# Patient Record
Sex: FEMALE | Race: WHITE | NOT HISPANIC OR LATINO | URBAN - METROPOLITAN AREA
[De-identification: names, ages, dates, MRNs, and addresses within clinical notes are randomized per-mention and may not be internally consistent; named-entity substitution may affect disease eponyms.]

---

## 2023-12-07 ENCOUNTER — APPOINTMENT (OUTPATIENT)
Dept: NEUROSURGERY | Facility: CLINIC | Age: 48
End: 2023-12-07
Payer: COMMERCIAL

## 2024-01-11 ENCOUNTER — OFFICE VISIT (OUTPATIENT)
Dept: NEUROSURGERY | Facility: CLINIC | Age: 49
End: 2024-01-11
Payer: COMMERCIAL

## 2024-01-11 VITALS
HEART RATE: 72 BPM | WEIGHT: 205 LBS | TEMPERATURE: 96.4 F | BODY MASS INDEX: 32.18 KG/M2 | HEIGHT: 67 IN | SYSTOLIC BLOOD PRESSURE: 159 MMHG | DIASTOLIC BLOOD PRESSURE: 83 MMHG

## 2024-01-11 DIAGNOSIS — D32.9 MENINGIOMA (MULTI): Primary | ICD-10-CM

## 2024-01-11 PROCEDURE — 99213 OFFICE O/P EST LOW 20 MIN: CPT | Performed by: NURSE PRACTITIONER

## 2024-01-11 RX ORDER — PANTOPRAZOLE SODIUM 40 MG/1
40 TABLET, DELAYED RELEASE ORAL
COMMUNITY

## 2024-01-11 ASSESSMENT — PAIN SCALES - GENERAL: PAINLEVEL: 0-NO PAIN

## 2024-01-11 NOTE — PROGRESS NOTES
"NEUROSURGERY EVALUATION    Diagnosis  Erica was seen today for new patient visit.  Diagnoses and all orders for this visit:  Meningioma (CMS/HCC)        History of Present Illness  Erica Wayne is a 48-year-old female who presented for evaluation of sinus headache and was incidentally found to have two right hemisphere meningioma. MRI on 03/30/23 demonstrated an extra-axial R parietal convexity lesion measuring 2.4 x 2.8 x 2.7cm with dural tail. A second lesion was identified at the right parasagittal aspect of right parieto-occipital convexity measuring 2.6 x 1.6 x 2.6cm. Patient was evaluated by Dr. Mills who recommended a short interval follow up MRI which was completed 07/05/23 and stable. Patient presents to clinic for FUV.     Reports intermittent headaches that are secondary to her sinus. No blurred or double vision. No history of seizure.           Vitals  /83   Pulse 72   Temp 35.8 °C (96.4 °F)   Ht 1.702 m (5' 7\")   Wt 93 kg (205 lb)   BMI 32.11 kg/m²       Physical Exam  A&Ox3   Fluent speech   EOMI   HEADLEY; strength 5/5; no drift   SILT   Face and should shrug symmetrical   Gait normal       Provider Impressions  Patient is a 48-year-old female who presents for follow up after undergoing evaluation of sinus headache incidentally found to have two right hemisphere meningioma. MRI demonstrated an extra-axial R parietal convexity lesion measuring 2.4 x 2.8 x 2.7cm with dural tail. A second lesion was identified at the right parasagittal aspect of right parieto-occipital convexity measuring 2.6 x 1.6 x 2.6cm. Short interval follow up MRI remained stable. No neurological complaints at this time. Patient is due for a MRI Brain w/wo contrast. If stable will plan to increase interval to one year. Once imaging is completed will call patient with results. Plan discussed with patient who was in agreement. All questions answered.       Previous History  No past medical history on file.  Past Surgical " History:   Procedure Laterality Date    COLONOSCOPY  01/31/2018    Complete Colonoscopy    OTHER SURGICAL HISTORY  02/15/2018    Cholecystectomy Laparoscopic Robotic-Assisted        No family history on file.  No Known Allergies  Current Outpatient Medications   Medication Instructions    pantoprazole (PROTONIX) 40 mg, oral, Daily before breakfast, Do not crush, chew, or split.

## 2024-02-07 ENCOUNTER — DOCUMENTATION (OUTPATIENT)
Dept: NEUROSURGERY | Facility: HOSPITAL | Age: 49
End: 2024-02-07
Payer: COMMERCIAL

## 2024-02-07 NOTE — PROGRESS NOTES
Results Review     Imaging: MRI Brain w/wo 01/19/24 Continues to show extra-axial dural based R parietal convexity meningioma measuring 2.8cm and 2.4cm R parasagittal partial lesion that extends into the superior sagittal sinus (patent) that remain unchanged. Given stability will continue with surveillance imaging.       Follow Up:  MRI brain w/wo in 1 year.     Left VM for patient.

## 2024-02-15 PROBLEM — K80.20 CHOLELITHIASIS: Status: ACTIVE | Noted: 2024-02-15

## 2024-02-15 PROBLEM — D32.0 BENIGN NEOPLASM OF CEREBRAL MENINGES (MULTI): Status: ACTIVE | Noted: 2023-04-25

## 2024-02-15 RX ORDER — PANTOPRAZOLE SODIUM 40 MG/1
40 TABLET, DELAYED RELEASE ORAL
COMMUNITY
Start: 2018-01-31

## 2024-02-15 RX ORDER — VALACYCLOVIR HYDROCHLORIDE 1 G/1
TABLET, FILM COATED ORAL
COMMUNITY
Start: 2023-10-02

## 2024-02-15 RX ORDER — PANTOPRAZOLE SODIUM 20 MG/1
20 TABLET, DELAYED RELEASE ORAL
COMMUNITY
Start: 2023-08-29

## 2024-02-15 RX ORDER — METHYLPREDNISOLONE 4 MG/1
4 TABLET ORAL
COMMUNITY
Start: 2023-03-12

## 2024-02-15 RX ORDER — DOXYCYCLINE HYCLATE 100 MG
100 TABLET ORAL
COMMUNITY
Start: 2023-03-21

## 2025-01-19 DIAGNOSIS — D32.9 MENINGIOMA (MULTI): Primary | ICD-10-CM

## 2025-02-04 ENCOUNTER — HOSPITAL ENCOUNTER (OUTPATIENT)
Dept: RADIOLOGY | Facility: EXTERNAL LOCATION | Age: 50
Discharge: HOME | End: 2025-02-04

## 2025-02-06 ENCOUNTER — APPOINTMENT (OUTPATIENT)
Dept: NEUROSURGERY | Facility: CLINIC | Age: 50
End: 2025-02-06
Payer: COMMERCIAL

## 2025-02-13 ENCOUNTER — OFFICE VISIT (OUTPATIENT)
Dept: NEUROSURGERY | Facility: CLINIC | Age: 50
End: 2025-02-13
Payer: COMMERCIAL

## 2025-02-13 VITALS
HEART RATE: 79 BPM | WEIGHT: 205 LBS | SYSTOLIC BLOOD PRESSURE: 139 MMHG | DIASTOLIC BLOOD PRESSURE: 80 MMHG | RESPIRATION RATE: 18 BRPM | BODY MASS INDEX: 32.11 KG/M2 | TEMPERATURE: 97.4 F

## 2025-02-13 DIAGNOSIS — D32.9 MENINGIOMA (MULTI): Primary | ICD-10-CM

## 2025-02-13 PROCEDURE — 99214 OFFICE O/P EST MOD 30 MIN: CPT | Mod: 57 | Performed by: NEUROLOGICAL SURGERY

## 2025-02-13 PROCEDURE — 99214 OFFICE O/P EST MOD 30 MIN: CPT | Performed by: NEUROLOGICAL SURGERY

## 2025-02-13 RX ORDER — DIAPER,BRIEF,ADULT, DISPOSABLE
500 EACH MISCELLANEOUS DAILY
COMMUNITY

## 2025-02-13 ASSESSMENT — PAIN SCALES - GENERAL: PAINLEVEL_OUTOF10: 0-NO PAIN

## 2025-02-13 NOTE — PROGRESS NOTES
[facility]  Neurosurgery    History of Present Illness      Erica Wayne is a 49-year-old female with no significant PMH who presented for evaluation for sinus headache and was incidentally found to have two right hemisphere meningioma upon workup. Right parietal convexity and right parasagittal lesion. Given no edema or mass effect patient was recommended for continued observation with surveillance. Her most recent imaging concerning for growth of right parietal lesion convexity lesion. Patient presents to clinic for follow up.       She denies headaches weakness numbness seizures. She is not on any hormone replacement medications. She recently had a mammogram which was negative. Not on any blood thinners.             Objective      Vitals:   /80   Pulse 79   Temp 36.3 °C (97.4 °F)   Resp 18   Wt 93 kg (205 lb)   BMI 32.11 kg/m²         Physical Exam:      Awake, alert, oriented times 3.   EOM intact  Pupils are equal round and reactive  Intact facial sensation  No facial asymmetry  Intact hearing bilateraly  Midline tongue protrusion  Symmetric shoulder elevation  Symmetric head turning    5/5 in all extremities  No sensory deficits          Relevant Results:  We personally reviewed her MRI which shows growth of the right convexity meningioma as well as the stable parasagittal meningioma.   MRI Brain w/wo 01/29/25 --> -1/19/24 --> 07/205/23                      Assessment & Plan      Diagnosis:  Diagnoses and all orders for this visit:  Meningioma (Multi)          Provider Impression:   Mrs Wayne presents to clinic to discuss her recent imaging showing growth of her right convexity meningioma. We discussed that given the growth of the meningioma and its current size, we do favor surgical resection. The smaller meningioma does not need treatment at this time, but if it were to grow further, we will discuss radiation therapy vs. Surgery. She wishes to proceed with surgery.     Patient seen and  examined and has growoing right conviexity neoplasm    Natural history and treatment options discussed in detail.    Given patient age, symptoms, etc. I recommend consideration for right craniotomy for resection of mass    Risks of treatment and alternatives discussed in detail (bleeding, infection, paralysis, stroke, death, radiation, seizures, paralysis, observation, etc.) and patient agrees to proceed as noted above.    I also discussed that I perform overlapping surgical cases but would be present for all critical portions of the surgical procedure.      Medical History     No past medical history on file.  Past Surgical History:   Procedure Laterality Date    COLONOSCOPY  01/31/2018    Complete Colonoscopy    OTHER SURGICAL HISTORY  02/15/2018    Cholecystectomy Laparoscopic Robotic-Assisted        No family history on file.  No Known Allergies  Current Outpatient Medications   Medication Instructions    cholecalciferol, vitamin D3, (D3-2000 ORAL) oral, Daily    doxycycline (VIBRA-TABS) 100 mg    loratadine (CLARITIN ORAL) ORAL, DAILY, Date: 09/19/2018 15:14:00 EDT    lysine 500 mg, oral, Daily    methylPREDNISolone (MEDROL DOSPAK) 4 mg    pantoprazole (PROTONIX) 40 mg, Daily before breakfast    pantoprazole (PROTONIX) 20 mg    pantoprazole (PROTONIX) 40 mg    valACYclovir (Valtrex) 1 gram tablet take 2 tablets by mouth twice a day for 1 day at first sign of cold sore

## 2025-04-03 ENCOUNTER — HOSPITAL ENCOUNTER (OUTPATIENT)
Facility: HOSPITAL | Age: 50
Setting detail: SURGERY ADMIT
DRG: 027 | End: 2025-04-03
Attending: NEUROLOGICAL SURGERY | Admitting: NEUROLOGICAL SURGERY
Payer: COMMERCIAL

## 2025-04-03 ENCOUNTER — PREP FOR PROCEDURE (OUTPATIENT)
Dept: NEUROSURGERY | Facility: HOSPITAL | Age: 50
End: 2025-04-03
Payer: COMMERCIAL

## 2025-04-03 DIAGNOSIS — K59.03 CONSTIPATION DUE TO PAIN MEDICATION: ICD-10-CM

## 2025-04-03 DIAGNOSIS — D32.9 MENINGIOMA (MULTI): Primary | ICD-10-CM

## 2025-04-03 DIAGNOSIS — G89.18 POST-OP PAIN: ICD-10-CM

## 2025-04-10 ENCOUNTER — PRE-ADMISSION TESTING (OUTPATIENT)
Dept: PREADMISSION TESTING | Facility: HOSPITAL | Age: 50
End: 2025-04-10
Payer: COMMERCIAL

## 2025-04-10 VITALS
HEART RATE: 77 BPM | WEIGHT: 218.8 LBS | BODY MASS INDEX: 34.34 KG/M2 | SYSTOLIC BLOOD PRESSURE: 137 MMHG | OXYGEN SATURATION: 97 % | HEIGHT: 67 IN | DIASTOLIC BLOOD PRESSURE: 85 MMHG | TEMPERATURE: 98.3 F

## 2025-04-10 DIAGNOSIS — D32.9 MENINGIOMA (MULTI): ICD-10-CM

## 2025-04-10 DIAGNOSIS — I34.1 MVP (MITRAL VALVE PROLAPSE): ICD-10-CM

## 2025-04-10 DIAGNOSIS — Z01.818 PREOPERATIVE EXAMINATION: Primary | ICD-10-CM

## 2025-04-10 LAB
ABO GROUP (TYPE) IN BLOOD: NORMAL
ANION GAP SERPL CALC-SCNC: 16 MMOL/L (ref 10–20)
ANTIBODY SCREEN: NORMAL
BUN SERPL-MCNC: 14 MG/DL (ref 6–23)
CALCIUM SERPL-MCNC: 9.2 MG/DL (ref 8.6–10.6)
CHLORIDE SERPL-SCNC: 105 MMOL/L (ref 98–107)
CO2 SERPL-SCNC: 21 MMOL/L (ref 21–32)
CREAT SERPL-MCNC: 0.66 MG/DL (ref 0.5–1.05)
EGFRCR SERPLBLD CKD-EPI 2021: >90 ML/MIN/1.73M*2
ERYTHROCYTE [DISTWIDTH] IN BLOOD BY AUTOMATED COUNT: 12.8 % (ref 11.5–14.5)
GLUCOSE SERPL-MCNC: 126 MG/DL (ref 74–99)
HCT VFR BLD AUTO: 39.2 % (ref 36–46)
HGB BLD-MCNC: 12.7 G/DL (ref 12–16)
MCH RBC QN AUTO: 30 PG (ref 26–34)
MCHC RBC AUTO-ENTMCNC: 32.4 G/DL (ref 32–36)
MCV RBC AUTO: 93 FL (ref 80–100)
NRBC BLD-RTO: 0 /100 WBCS (ref 0–0)
PLATELET # BLD AUTO: 329 X10*3/UL (ref 150–450)
POTASSIUM SERPL-SCNC: 4.4 MMOL/L (ref 3.5–5.3)
RBC # BLD AUTO: 4.24 X10*6/UL (ref 4–5.2)
RH FACTOR (ANTIGEN D): NORMAL
SODIUM SERPL-SCNC: 138 MMOL/L (ref 136–145)
WBC # BLD AUTO: 5.6 X10*3/UL (ref 4.4–11.3)

## 2025-04-10 PROCEDURE — 87081 CULTURE SCREEN ONLY: CPT

## 2025-04-10 PROCEDURE — 36415 COLL VENOUS BLD VENIPUNCTURE: CPT

## 2025-04-10 PROCEDURE — 86901 BLOOD TYPING SEROLOGIC RH(D): CPT

## 2025-04-10 PROCEDURE — 82374 ASSAY BLOOD CARBON DIOXIDE: CPT

## 2025-04-10 PROCEDURE — 99204 OFFICE O/P NEW MOD 45 MIN: CPT

## 2025-04-10 PROCEDURE — 85027 COMPLETE CBC AUTOMATED: CPT

## 2025-04-10 RX ORDER — CHLORHEXIDINE GLUCONATE ORAL RINSE 1.2 MG/ML
15 SOLUTION DENTAL AS NEEDED
Qty: 120 ML | Refills: 0 | Status: SHIPPED | OUTPATIENT
Start: 2025-04-10 | End: 2025-04-12

## 2025-04-10 RX ORDER — CHLORHEXIDINE GLUCONATE 40 MG/ML
SOLUTION TOPICAL DAILY PRN
Qty: 473 ML | Refills: 0 | Status: SHIPPED | OUTPATIENT
Start: 2025-04-10

## 2025-04-10 ASSESSMENT — DUKE ACTIVITY SCORE INDEX (DASI)
CAN YOU RUN A SHORT DISTANCE: YES
CAN YOU DO MODERATE WORK AROUND THE HOUSE LIKE VACUUMING, SWEEPING FLOORS OR CARRYING GROCERIES: YES
CAN YOU DO HEAVY WORK AROUND THE HOUSE LIKE SCRUBBING FLOORS OR LIFTING AND MOVING HEAVY FURNITURE: YES
CAN YOU TAKE CARE OF YOURSELF (EAT, DRESS, BATHE, OR USE TOILET): YES
CAN YOU HAVE SEXUAL RELATIONS: YES
CAN YOU PARTICIPATE IN MODERATE RECREATIONAL ACTIVITIES LIKE GOLF, BOWLING, DANCING, DOUBLES TENNIS OR THROWING A BASEBALL OR FOOTBALL: YES
CAN YOU DO LIGHT WORK AROUND THE HOUSE LIKE DUSTING OR WASHING DISHES: YES
CAN YOU WALK A BLOCK OR TWO ON LEVEL GROUND: YES
DASI METS SCORE: 9.9
CAN YOU DO YARD WORK LIKE RAKING LEAVES, WEEDING OR PUSHING A MOWER: YES
CAN YOU PARTICIPATE IN STRENOUS SPORTS LIKE SWIMMING, SINGLES TENNIS, FOOTBALL, BASKETBALL, OR SKIING: YES
CAN YOU WALK INDOORS, SUCH AS AROUND YOUR HOUSE: YES
TOTAL_SCORE: 58.2
CAN YOU CLIMB A FLIGHT OF STAIRS OR WALK UP A HILL: YES

## 2025-04-10 ASSESSMENT — ENCOUNTER SYMPTOMS
EYE DISCHARGE: 0
NAUSEA: 0
PND: 0
EXCESSIVE BLEEDING: 0
NERVOUS/ANXIOUS: 0
DYSURIA: 0
PALPITATIONS: 0
VOICE CHANGE: 0
TREMORS: 0
POLYDIPSIA: 0
WHEEZING: 0
NECK STIFFNESS: 0
CHILLS: 0
LIGHT-HEADEDNESS: 0
WOUND: 0
ARTHRALGIAS: 0
JOINT SWELLING: 0
MYALGIAS: 0
NECK MASS: 0
VERTIGO: 0
CONSTIPATION: 0
BLOOD IN STOOL: 0
DYSPNEA WITH EXERTION: 0
LIMITED RANGE OF MOTION: 0
BRUISES/BLEEDS EASILY: 0
NECK PAIN: 0
DIFFICULTY URINATING: 0
RHINORRHEA: 0
VISUAL CHANGE: 0
NUMBNESS: 0
UNEXPECTED WEIGHT CHANGE: 0
DYSPNEA AT REST: 0
SKIN CHANGES: 0
TROUBLE SWALLOWING: 0
HEMOPTYSIS: 0
ABDOMINAL PAIN: 0
ABDOMINAL DISTENTION: 0
DIARRHEA: 0
FEVER: 0
DOUBLE VISION: 0
WEAKNESS: 0
COUGH: 0
VOMITING: 0
NECK SWELLING: 0
SINUS CONGESTION: 0
SHORTNESS OF BREATH: 0
CONFUSION: 0

## 2025-04-10 ASSESSMENT — LIFESTYLE VARIABLES: SMOKING_STATUS: NONSMOKER

## 2025-04-10 NOTE — CPM/PAT H&P
CPM/PAT Evaluation       Name: Erica Wayne (Erica Wayne)  /Age: 1975/49 y.o.     Visit Type:   In-Person       Chief Complaint: perioperative evaluation    HPI HPI: 48 y/o female scheduled for Right craniotomy for tumor resection - Right  on 2025  secondary to Meningioma  with Dr. Selwyn Lewis  who referred to [facility].  Presents to [facility] today for perioperative risk stratification and optimization. PMHX includes Meningioma, migraines, GERD, shatzki ring, MVP     PCP: JAMARCUS CARBAJAL   Specialists:   Neurosurgery - Selwyn Lewis MD .          Past Medical History:   Diagnosis Date    Colon polyp     GERD (gastroesophageal reflux disease)        Past Surgical History:   Procedure Laterality Date    COLONOSCOPY  2018    Complete Colonoscopy    OTHER SURGICAL HISTORY  02/15/2018    Cholecystectomy Laparoscopic Robotic-Assisted       Patient Sexual activity questions deferred to the physician.    Family History   Problem Relation Name Age of Onset    No Known Problems Mother      Hypertension Father      Diabetes Father         No Known Allergies    Prior to Admission medications    Medication Sig Start Date End Date Taking? Authorizing Provider   cholecalciferol, vitamin D3, (D3-2000 ORAL) Take by mouth once daily.    Historical Provider, MD   doxycycline (Vibra-Tabs) 100 mg tablet 1 tablet (100 mg).  Patient not taking: Reported on 2025 3/21/23   Historical Provider, MD   loratadine (CLARITIN ORAL) ORAL, DAILY, Date: 2018 15:14:00 EDT  Patient not taking: Reported on 2025   Historical Provider, MD   lysine 500 mg tablet Take 1 tablet (500 mg) by mouth once daily.    Historical Provider, MD   methylPREDNISolone (Medrol Dospak) 4 mg tablets 1 tablet (4 mg).  Patient not taking: Reported on 2025 3/12/23   Historical Provider, MD   pantoprazole (ProtoNix) 20 mg EC tablet 1 tablet (20 mg). 23   Historical Provider, MD   pantoprazole (ProtoNix) 40 mg EC tablet  Take 1 tablet (40 mg) by mouth once daily in the morning. Take before meals. Do not crush, chew, or split.  Patient not taking: Reported on 2/13/2025    Historical Provider, MD   pantoprazole (Protonix) 40 mg EC tablet Take 1 tablet (40 mg) by mouth.  Patient not taking: Reported on 2/13/2025 1/31/18   Historical Provider, MD   valACYclovir (Valtrex) 1 gram tablet take 2 tablets by mouth twice a day for 1 day at first sign of cold sore  Patient not taking: Reported on 2/13/2025 10/2/23   Historical Provider, MD LEA ROS:   Constitutional:    no fever   no chills   no unexpected weight change  Neuro/Psych:    no numbness   no weakness   no light-headedness   no tremors   no confusion   not nervous/anxious   no self-injury   no suicidal ideation  Eyes:    no discharge   no vision loss   no diplopia   no visual disturbance   use of corrective lenses (readers)  Ears:    no ear pain   no hearing loss   no vertigo   no tinnitus   no hearing aides  Nose:    no nasal discharge   no sinus congestion   no epistaxis  Mouth:    no dental issues   no mouth pain   no oral bleeding   no mouth lesions  Throat:    no throat pain   no dysphagia   no voice change  Neck:    no neck pain   neck swelling   no neck stiffness   no neck masses  Cardio:    no chest pain   no palpitations   no peripheral edema   no dyspnea   no YANG   no paroxysmal nocturnal dyspnea  Respiratory:    no cough   no wheezing   no hemoptysis   no shortness of breath  Endocrine:    no cold intolerance   no heat intolerance   no polydipsia  GI:    no abdominal distention   no abdominal pain   no constipation   no diarrhea   no nausea   no vomiting   no blood in stool  :    no difficulty urinating   no dysuria   no oliguria   no polyuria  Musculoskeletal:    no arthralgias   no myalgias   no decreased ROM   no swelling  Hematologic:    does not bruise/bleed easily   no excessive bleeding   no history of blood transfusion   no blood clots  Skin:   no skin  "changes   no sores/wound   no rash      Physical Exam  Vitals reviewed. Physical exam within normal limits.   Constitutional:       General: She is not in acute distress.     Appearance: Normal appearance. She is normal weight. She is not ill-appearing, toxic-appearing or diaphoretic.   HENT:      Head: Normocephalic.      Nose: Nose normal. No congestion or rhinorrhea.      Mouth/Throat:      Mouth: Mucous membranes are moist.      Pharynx: Oropharynx is clear. No oropharyngeal exudate or posterior oropharyngeal erythema.   Eyes:      General: No scleral icterus.        Right eye: No discharge.         Left eye: No discharge.      Conjunctiva/sclera: Conjunctivae normal.   Neck:      Vascular: No carotid bruit.   Cardiovascular:      Rate and Rhythm: Normal rate and regular rhythm.      Pulses: Normal pulses.      Heart sounds: Normal heart sounds. No murmur heard.     No friction rub. No gallop.   Pulmonary:      Effort: Pulmonary effort is normal. No respiratory distress.      Breath sounds: Normal breath sounds. No stridor. No wheezing, rhonchi or rales.   Chest:      Chest wall: No tenderness.   Musculoskeletal:         General: No swelling or tenderness. Normal range of motion.      Cervical back: Normal range of motion and neck supple. No rigidity or tenderness.   Skin:     General: Skin is warm and dry.   Neurological:      General: No focal deficit present.      Mental Status: She is alert.      Motor: No weakness.   Psychiatric:         Mood and Affect: Mood normal.         Behavior: Behavior normal.         Thought Content: Thought content normal.         Judgment: Judgment normal.          PAT AIRWAY:   Airway:     Mallampati::  I    TM distance::  >3 FB    Neck ROM::  Full        Visit Vitals  /85   Pulse 77   Temp 36.8 °C (98.3 °F) (Temporal)   Ht 1.702 m (5' 7\")   Wt 99.2 kg (218 lb 12.8 oz)   SpO2 97%   BMI 34.27 kg/m²   Smoking Status Never   BSA 2.17 m²       DASI Risk Score      Flowsheet " Row Pre-Admission Testing from 4/10/2025 in [facility]   Can you take care of yourself (eat, dress, bathe, or use toilet)?  2.75 filed at 04/10/2025 0745   Can you walk indoors, such as around your house? 1.75 filed at 04/10/2025 0745   Can you walk a block or two on level ground?  2.75 filed at 04/10/2025 0745   Can you climb a flight of stairs or walk up a hill? 5.5 filed at 04/10/2025 0745   Can you run a short distance? 8 filed at 04/10/2025 0745   Can you do light work around the house like dusting or washing dishes? 2.7 filed at 04/10/2025 0745   Can you do moderate work around the house like vacuuming, sweeping floors or carrying groceries? 3.5 filed at 04/10/2025 0745   Can you do heavy work around the house like scrubbing floors or lifting and moving heavy furniture?  8 filed at 04/10/2025 0745   Can you do yard work like raking leaves, weeding or pushing a mower? 4.5 filed at 04/10/2025 0745   Can you have sexual relations? 5.25 filed at 04/10/2025 0745   Can you participate in moderate recreational activities like golf, bowling, dancing, doubles tennis or throwing a baseball or football? 6 filed at 04/10/2025 0745   Can you participate in strenous sports like swimming, singles tennis, football, basketball, or skiing? 7.5 filed at 04/10/2025 0745   DASI SCORE 58.2 filed at 04/10/2025 0745   METS Score (Will be calculated only when all the questions are answered) 9.9 filed at 04/10/2025 0745          Capyanirai DVT Assessment      Flowsheet Row Pre-Admission Testing from 4/10/2025 in [facility]   DVT Score (IF A SCORE IS NOT CALCULATING, MUST SELECT A BMI TO COMPLETE) 9 filed at 04/10/2025 0945   Surgical Factors Major surgery planned, lasting over 3 hours filed at 04/10/2025 0945   BMI (BMI MUST BE CHOSEN) 31-40 (Obesity) filed at 04/10/2025 0945          Modified Frailty Index    No data to display       [id number] Stroke Risk Points  Current as of just now        N/A 0 to  9 Points:      Last Change: N/A          The [id number] risk score (Lip GH, et al. 2009. © 2010 [employer]) quantifies the risk of stroke for a patient with atrial fibrillation. For patients without atrial fibrillation or under the age of 18 this score appears as N/A. Higher score values generally indicate higher risk of stroke.        This score is not applicable to this patient. Components are not calculated.          Revised Cardiac Risk Index      Flowsheet Row Pre-Admission Testing from 4/10/2025 in [facility]   High-Risk Surgery (Intraperitoneal, Intrathoracic,Suprainguinal vascular) 0 filed at 04/10/2025 0944   History of ischemic heart disease (History of MI, History of positive exercuse test, Current chest paint considered due to myocardial ischemia, Use of nitrate therapy, ECG with pathological Q Waves) 0 filed at 04/10/2025 0944   History of congestive heart failure (pulmonary edemia, bilateral rales or S3 gallop, Paroxysmal nocturnal dyspnea, CXR showing pulmonary vascular redistribution) 0 filed at 04/10/2025 0944   History of cerebrovascular disease (Prior TIA or stroke) 0 filed at 04/10/2025 0944   Pre-operative insulin treatment 0 filed at 04/10/2025 0944   Pre-operative creatinine>2 mg/dl 0 filed at 04/10/2025 0944   Revised Cardiac Risk Calculator 0 filed at 04/10/2025 0944          Apfel Simplified Score      Flowsheet Row Pre-Admission Testing from 4/10/2025 in [facility]   Smoking status 1 filed at 04/10/2025 0945   History of motion sickness or PONV  0 filed at 04/10/2025 0945   Use of postoperative opioids 1 filed at 04/10/2025 0945   Gender - Female 1=Yes filed at 04/10/2025 0945   Apfel Simplified Score Calculator 3 filed at 04/10/2025 0945          Risk Analysis Index Results This Encounter    No data found in the last 10 encounters.       Stop Bang Score      Flowsheet Row Pre-Admission Testing from 4/10/2025 in [facility]   Do you snore loudly? 0 filed at 04/10/2025 0746   Do you often feel tired or fatigued after your sleep? 0 filed at 04/10/2025 0746   Has anyone ever observed you stop breathing in your sleep? 0 filed at 04/10/2025 0746   Do you have or are you being treated for high blood pressure? 0 filed at 04/10/2025 0746   Recent BMI (Calculated) 32.1 filed at 04/10/2025 0746   Is BMI greater than 35 kg/m2? 0=No filed at 04/10/2025 0746   Age older than 50 years old? 1=Yes filed at 04/10/2025 0746   Is your neck circumference greater than 17 inches (Male) or 16 inches (Female)? 0 filed at 04/10/2025 0746   Gender - Male 0=No filed at 04/10/2025 0746   STOP-BANG Total Score 1 filed at 04/10/2025 0746          Prodigy: High Risk  Total Score: 0          ARISCAT Score for Postoperative Pulmonary Complications      Flowsheet Row Pre-Admission Testing from 4/10/2025 in [facility]   Age Calculated Score 3 filed at 04/10/2025 0944   Preoperative SpO2 0 filed at 04/10/2025 0944   Respiratory infection in the last month Either upper or lower (i.e., URI, bronchitis, pneumonia), with fever and antibiotic treatment 0 filed at 04/10/2025 0944   Preoperative anemia (Hgb less than 10 g/dl) 0 filed at 04/10/2025 0944   Surgical incision  0 filed at 04/10/2025 0944   Duration of surgery  23 filed at 04/10/2025 0944   Emergency Procedure  0 filed at 04/10/2025 0944   ARISCAT Total Score  26 filed at 04/10/2025 0944          Jenkins Perioperative Risk for Myocardial Infarction or Cardiac Arrest (CHRISTOPHER)    No data to display           Assessment and Plan:   Anesthesia/Airway:  No anesthesia complications      Neuro:    #Meningioma, migraines - managed by neurosurgery and PCP. Seeking surgical intervention for meningioma.  Patient reports that she took azithromycin about a week ago due to nasal congestion and headache.  She denies at any point bodyache/fever/chils/cough/sore throat.  Patient reports complete resolution  "at this time    Patient is at an increased risk for post operative delirium secondary to type and duration of surgery.  Preoperative brain exercise educational handout provided to patient.    The patient is at an increased risk for perioperative stroke secondary to female sex , neuro surgery, general anesthesia, and op time >2.5 hours.       HEENT/Airway:    No HEENT diagnosis or significant findings on chart review or clinical presentation and evaluation. No further preoperative testing/intervention indicated at this time.      Cardiovascular:    #MVP -  follows with PCP, patient reports she has not had an echocardiogram since original diagnosis.  She does not follow with cardiology reports her primary care will tell her she has \"regurgitation\" and has debated sending her cardiology however has not.  No murmur noted on physical exam today, patient is asymptomatic. BP today is 137/85. METS is >4 and scheduled for non-cardiac surgery.  Echocardiograms prior to surgery for further evaluation of murmur.    METS are 9.9    RCRI  0 which is 3.9% 30 day risk of MACE (risk for cardiac death, nonfatal myocardial infarction, and nonfactal cardiac arrest    CHRISTOPHER score which indicates a   0.2 % risk of intraoperative or 30-day postoperative MACE          Pulmonary:    No Pulmonary diagnosis or significant findings on chart review or clinical presentation and evaluation. Preoperative deep breathing educational handout provided to patient.    No pulmonary diagnosis, however patient is at increased risk of perioperative complications secondary to  obesity, duration of surgery > 2 hours, types of anesthetic    ARISCAT:    26  points which is a intermediate (13.3%) risk of in-hospital post-op pulmonary complications     PRODIGY:  0  points which is a low risk of post op opioid induced respiratory depression episodes    STOP BAN   points which is a low risk for moderate to severe ALANIS    [facility] toilet education discussed, " patient also provided deep breathing exercises and incentive spirometry educational handout      Renal:   No renal diagnosis, however patient is at increase risk for perioperative renal complications secondary to  BMI equal to or greater than 30       Endocrine:   No endocrine diagnosis or significant findings on chart review or clinical presentation and evaluation. No further testing or intervention is indicated at this time.      Hematologic:      Preoperative DVT educational handout provided to patient.  No hematologic diagnosis, however patient is at an increased risk for DVT  Caprini Score:  9  points which is a highest risk of perioperative VTE      Gastrointestinal:   #GERD, shatzki ring, -medicated with pantoprazole (continue), follows with PCP.  Reports is well-controlled no further need for perioperative intervention    EAT-10 score of    0  : self-perceived oropharyngeal dysphagia scale (0-40)     Apfel: 3 points 61% risk for post operative N/V      Infectious disease:     No infectious diagnosis or significant findings on chart review or clinical presentation and evaluation.   Prescription provided for CHG body wash and dental rinse. CHG use instructions reviewed and provided to patient.  Staph screen collected      Musculoskeletal:    No diagnosis or significant findings on chart review or clinical presentation and evaluation.       Other:     Hold all vitamins and supplements 7 days prior to surgery  Tylenol okay to continue, please hold Aleve/naproxen/ibuprofen (NSAIDs) for 7 days prior to surgery  Continue valacyclovir and claritin PRN      Labs ordered  cbc, basic, t/s, and MSSA/MRSA culture      Medication instructions and NPO guidelines reviewed with the patient.  All questions or concerns discussed and addressed.      Leonora Medina PA-C         valacyclovir and claritin PRN      Labs ordered  Recent Results (from the past 3 weeks)   CBC    Collection Time: 04/10/25  8:01 AM   Result Value Ref Range    WBC 5.6 4.4 - 11.3 x10*3/uL    nRBC 0.0 0.0 - 0.0 /100 WBCs    RBC 4.24 4.00 - 5.20 x10*6/uL    Hemoglobin 12.7 12.0 - 16.0 g/dL    Hematocrit 39.2 36.0 - 46.0 %    MCV 93 80 - 100 fL    MCH 30.0 26.0 - 34.0 pg    MCHC 32.4 32.0 - 36.0 g/dL    RDW 12.8 11.5 - 14.5 %    Platelets 329 150 - 450 x10*3/uL   Basic Metabolic Panel    Collection Time: 04/10/25  8:01 AM   Result Value Ref Range    Glucose 126 (H) 74 - 99 mg/dL    Sodium 138 136 - 145 mmol/L    Potassium 4.4 3.5 - 5.3 mmol/L    Chloride 105 98 - 107 mmol/L    Bicarbonate 21 21 - 32 mmol/L    Anion Gap 16 10 - 20 mmol/L    Urea Nitrogen 14 6 - 23 mg/dL    Creatinine 0.66 0.50 - 1.05 mg/dL    eGFR >90 >60 mL/min/1.73m*2    Calcium 9.2 8.6 - 10.6 mg/dL   Type And Screen Is this order related to pregnancy or an upcoming surgery? Yes; Where will this surgery/delivery be [facility]? [facility]; What is the date of the surgery? 4/21/2025; Has this patient ever had a transfusion? No; Has t...    Collection Time: 04/10/25  8:01 AM   Result Value Ref Range    ABO TYPE O     Rh TYPE POS     ANTIBODY SCREEN NEG    Staphylococcus aureus/MRSA colonization, Culture    Collection Time: 04/10/25  8:01 AM    Specimen: Nares/Axilla/Groin; Swab   Result Value Ref Range    Staph/MRSA Screen Culture No Staphylococcus aureus isolated    Transthoracic Echo (TTE) Complete    Collection Time: 04/15/25  9:52 AM   Result Value Ref Range    AV pk rebeka 1.70 m/s    LVOT diam 2.00 cm    MV E/A ratio 1.19     LA vol index A/L 36.2 ml/m2    Tricuspid annular plane systolic excursion 1.7 cm    LV EF 63 %    RV free wall pk S' 12.51 cm/s    LVIDd 5.43 cm    RVSP 24.4 mmHg    Aortic Valve Area by Continuity of Peak Velocity 2.27 cm2    AV pk grad 12 mmHg    LV A4C EF 73.0            Medication instructions and NPO guidelines  reviewed with the patient.  All questions or concerns discussed and addressed.      Leonora Medina PA-C

## 2025-04-10 NOTE — H&P (VIEW-ONLY)
CPM/PAT Evaluation       Name: Erica Wayne (Erica Wayne)  /Age: 1975/49 y.o.     Visit Type:   In-Person       Chief Complaint: perioperative evaluation    HPI HPI: 48 y/o female scheduled for Right craniotomy for tumor resection - Right  on 2025  secondary to Meningioma  with Dr. Selwyn Lewis  who referred to [facility].  Presents to [facility] today for perioperative risk stratification and optimization. PMHX includes Meningioma, migraines, GERD, shatzki ring, MVP     PCP: JAMARCUS CARBAJAL   Specialists:   Neurosurgery - Selwyn Lewis MD .          Past Medical History:   Diagnosis Date    Colon polyp     GERD (gastroesophageal reflux disease)        Past Surgical History:   Procedure Laterality Date    COLONOSCOPY  2018    Complete Colonoscopy    OTHER SURGICAL HISTORY  02/15/2018    Cholecystectomy Laparoscopic Robotic-Assisted       Patient Sexual activity questions deferred to the physician.    Family History   Problem Relation Name Age of Onset    No Known Problems Mother      Hypertension Father      Diabetes Father         No Known Allergies    Prior to Admission medications    Medication Sig Start Date End Date Taking? Authorizing Provider   cholecalciferol, vitamin D3, (D3-2000 ORAL) Take by mouth once daily.    Historical Provider, MD   doxycycline (Vibra-Tabs) 100 mg tablet 1 tablet (100 mg).  Patient not taking: Reported on 2025 3/21/23   Historical Provider, MD   loratadine (CLARITIN ORAL) ORAL, DAILY, Date: 2018 15:14:00 EDT  Patient not taking: Reported on 2025   Historical Provider, MD   lysine 500 mg tablet Take 1 tablet (500 mg) by mouth once daily.    Historical Provider, MD   methylPREDNISolone (Medrol Dospak) 4 mg tablets 1 tablet (4 mg).  Patient not taking: Reported on 2025 3/12/23   Historical Provider, MD   pantoprazole (ProtoNix) 20 mg EC tablet 1 tablet (20 mg). 23   Historical Provider, MD   pantoprazole (ProtoNix) 40 mg EC tablet  Take 1 tablet (40 mg) by mouth once daily in the morning. Take before meals. Do not crush, chew, or split.  Patient not taking: Reported on 2/13/2025    Historical Provider, MD   pantoprazole (Protonix) 40 mg EC tablet Take 1 tablet (40 mg) by mouth.  Patient not taking: Reported on 2/13/2025 1/31/18   Historical Provider, MD   valACYclovir (Valtrex) 1 gram tablet take 2 tablets by mouth twice a day for 1 day at first sign of cold sore  Patient not taking: Reported on 2/13/2025 10/2/23   Historical Provider, MD LEA ROS:   Constitutional:    no fever   no chills   no unexpected weight change  Neuro/Psych:    no numbness   no weakness   no light-headedness   no tremors   no confusion   not nervous/anxious   no self-injury   no suicidal ideation  Eyes:    no discharge   no vision loss   no diplopia   no visual disturbance   use of corrective lenses (readers)  Ears:    no ear pain   no hearing loss   no vertigo   no tinnitus   no hearing aides  Nose:    no nasal discharge   no sinus congestion   no epistaxis  Mouth:    no dental issues   no mouth pain   no oral bleeding   no mouth lesions  Throat:    no throat pain   no dysphagia   no voice change  Neck:    no neck pain   neck swelling   no neck stiffness   no neck masses  Cardio:    no chest pain   no palpitations   no peripheral edema   no dyspnea   no YANG   no paroxysmal nocturnal dyspnea  Respiratory:    no cough   no wheezing   no hemoptysis   no shortness of breath  Endocrine:    no cold intolerance   no heat intolerance   no polydipsia  GI:    no abdominal distention   no abdominal pain   no constipation   no diarrhea   no nausea   no vomiting   no blood in stool  :    no difficulty urinating   no dysuria   no oliguria   no polyuria  Musculoskeletal:    no arthralgias   no myalgias   no decreased ROM   no swelling  Hematologic:    does not bruise/bleed easily   no excessive bleeding   no history of blood transfusion   no blood clots  Skin:   no skin  "changes   no sores/wound   no rash      Physical Exam  Vitals reviewed. Physical exam within normal limits.   Constitutional:       General: She is not in acute distress.     Appearance: Normal appearance. She is normal weight. She is not ill-appearing, toxic-appearing or diaphoretic.   HENT:      Head: Normocephalic.      Nose: Nose normal. No congestion or rhinorrhea.      Mouth/Throat:      Mouth: Mucous membranes are moist.      Pharynx: Oropharynx is clear. No oropharyngeal exudate or posterior oropharyngeal erythema.   Eyes:      General: No scleral icterus.        Right eye: No discharge.         Left eye: No discharge.      Conjunctiva/sclera: Conjunctivae normal.   Neck:      Vascular: No carotid bruit.   Cardiovascular:      Rate and Rhythm: Normal rate and regular rhythm.      Pulses: Normal pulses.      Heart sounds: Normal heart sounds. No murmur heard.     No friction rub. No gallop.   Pulmonary:      Effort: Pulmonary effort is normal. No respiratory distress.      Breath sounds: Normal breath sounds. No stridor. No wheezing, rhonchi or rales.   Chest:      Chest wall: No tenderness.   Musculoskeletal:         General: No swelling or tenderness. Normal range of motion.      Cervical back: Normal range of motion and neck supple. No rigidity or tenderness.   Skin:     General: Skin is warm and dry.   Neurological:      General: No focal deficit present.      Mental Status: She is alert.      Motor: No weakness.   Psychiatric:         Mood and Affect: Mood normal.         Behavior: Behavior normal.         Thought Content: Thought content normal.         Judgment: Judgment normal.          PAT AIRWAY:   Airway:     Mallampati::  I    TM distance::  >3 FB    Neck ROM::  Full        Visit Vitals  /85   Pulse 77   Temp 36.8 °C (98.3 °F) (Temporal)   Ht 1.702 m (5' 7\")   Wt 99.2 kg (218 lb 12.8 oz)   SpO2 97%   BMI 34.27 kg/m²   Smoking Status Never   BSA 2.17 m²       DASI Risk Score      Flowsheet " Row Pre-Admission Testing from 4/10/2025 in [facility]   Can you take care of yourself (eat, dress, bathe, or use toilet)?  2.75 filed at 04/10/2025 0745   Can you walk indoors, such as around your house? 1.75 filed at 04/10/2025 0745   Can you walk a block or two on level ground?  2.75 filed at 04/10/2025 0745   Can you climb a flight of stairs or walk up a hill? 5.5 filed at 04/10/2025 0745   Can you run a short distance? 8 filed at 04/10/2025 0745   Can you do light work around the house like dusting or washing dishes? 2.7 filed at 04/10/2025 0745   Can you do moderate work around the house like vacuuming, sweeping floors or carrying groceries? 3.5 filed at 04/10/2025 0745   Can you do heavy work around the house like scrubbing floors or lifting and moving heavy furniture?  8 filed at 04/10/2025 0745   Can you do yard work like raking leaves, weeding or pushing a mower? 4.5 filed at 04/10/2025 0745   Can you have sexual relations? 5.25 filed at 04/10/2025 0745   Can you participate in moderate recreational activities like golf, bowling, dancing, doubles tennis or throwing a baseball or football? 6 filed at 04/10/2025 0745   Can you participate in strenous sports like swimming, singles tennis, football, basketball, or skiing? 7.5 filed at 04/10/2025 0745   DASI SCORE 58.2 filed at 04/10/2025 0745   METS Score (Will be calculated only when all the questions are answered) 9.9 filed at 04/10/2025 0745          Capyanirai DVT Assessment      Flowsheet Row Pre-Admission Testing from 4/10/2025 in [facility]   DVT Score (IF A SCORE IS NOT CALCULATING, MUST SELECT A BMI TO COMPLETE) 9 filed at 04/10/2025 0945   Surgical Factors Major surgery planned, lasting over 3 hours filed at 04/10/2025 0945   BMI (BMI MUST BE CHOSEN) 31-40 (Obesity) filed at 04/10/2025 0945          Modified Frailty Index    No data to display       [id number] Stroke Risk Points  Current as of just now        N/A 0 to  9 Points:      Last Change: N/A          The [id number] risk score (Lip GH, et al. 2009. © 2010 [employer]) quantifies the risk of stroke for a patient with atrial fibrillation. For patients without atrial fibrillation or under the age of 18 this score appears as N/A. Higher score values generally indicate higher risk of stroke.        This score is not applicable to this patient. Components are not calculated.          Revised Cardiac Risk Index      Flowsheet Row Pre-Admission Testing from 4/10/2025 in [facility]   High-Risk Surgery (Intraperitoneal, Intrathoracic,Suprainguinal vascular) 0 filed at 04/10/2025 0944   History of ischemic heart disease (History of MI, History of positive exercuse test, Current chest paint considered due to myocardial ischemia, Use of nitrate therapy, ECG with pathological Q Waves) 0 filed at 04/10/2025 0944   History of congestive heart failure (pulmonary edemia, bilateral rales or S3 gallop, Paroxysmal nocturnal dyspnea, CXR showing pulmonary vascular redistribution) 0 filed at 04/10/2025 0944   History of cerebrovascular disease (Prior TIA or stroke) 0 filed at 04/10/2025 0944   Pre-operative insulin treatment 0 filed at 04/10/2025 0944   Pre-operative creatinine>2 mg/dl 0 filed at 04/10/2025 0944   Revised Cardiac Risk Calculator 0 filed at 04/10/2025 0944          Apfel Simplified Score      Flowsheet Row Pre-Admission Testing from 4/10/2025 in [facility]   Smoking status 1 filed at 04/10/2025 0945   History of motion sickness or PONV  0 filed at 04/10/2025 0945   Use of postoperative opioids 1 filed at 04/10/2025 0945   Gender - Female 1=Yes filed at 04/10/2025 0945   Apfel Simplified Score Calculator 3 filed at 04/10/2025 0945          Risk Analysis Index Results This Encounter    No data found in the last 10 encounters.       Stop Bang Score      Flowsheet Row Pre-Admission Testing from 4/10/2025 in [facility]   Do you snore loudly? 0 filed at 04/10/2025 0746   Do you often feel tired or fatigued after your sleep? 0 filed at 04/10/2025 0746   Has anyone ever observed you stop breathing in your sleep? 0 filed at 04/10/2025 0746   Do you have or are you being treated for high blood pressure? 0 filed at 04/10/2025 0746   Recent BMI (Calculated) 32.1 filed at 04/10/2025 0746   Is BMI greater than 35 kg/m2? 0=No filed at 04/10/2025 0746   Age older than 50 years old? 1=Yes filed at 04/10/2025 0746   Is your neck circumference greater than 17 inches (Male) or 16 inches (Female)? 0 filed at 04/10/2025 0746   Gender - Male 0=No filed at 04/10/2025 0746   STOP-BANG Total Score 1 filed at 04/10/2025 0746          Prodigy: High Risk  Total Score: 0          ARISCAT Score for Postoperative Pulmonary Complications      Flowsheet Row Pre-Admission Testing from 4/10/2025 in [facility]   Age Calculated Score 3 filed at 04/10/2025 0944   Preoperative SpO2 0 filed at 04/10/2025 0944   Respiratory infection in the last month Either upper or lower (i.e., URI, bronchitis, pneumonia), with fever and antibiotic treatment 0 filed at 04/10/2025 0944   Preoperative anemia (Hgb less than 10 g/dl) 0 filed at 04/10/2025 0944   Surgical incision  0 filed at 04/10/2025 0944   Duration of surgery  23 filed at 04/10/2025 0944   Emergency Procedure  0 filed at 04/10/2025 0944   ARISCAT Total Score  26 filed at 04/10/2025 0944          Jenkins Perioperative Risk for Myocardial Infarction or Cardiac Arrest (CHRISTOPHER)    No data to display           Assessment and Plan:   Anesthesia/Airway:  No anesthesia complications      Neuro:    #Meningioma, migraines - managed by neurosurgery and PCP. Seeking surgical intervention for meningioma.  Patient reports that she took azithromycin about a week ago due to nasal congestion and headache.  She denies at any point bodyache/fever/chils/cough/sore throat.  Patient reports complete resolution  "at this time    Patient is at an increased risk for post operative delirium secondary to type and duration of surgery.  Preoperative brain exercise educational handout provided to patient.    The patient is at an increased risk for perioperative stroke secondary to female sex , neuro surgery, general anesthesia, and op time >2.5 hours.       HEENT/Airway:    No HEENT diagnosis or significant findings on chart review or clinical presentation and evaluation. No further preoperative testing/intervention indicated at this time.      Cardiovascular:    #MVP -  follows with PCP, patient reports she has not had an echocardiogram since original diagnosis.  She does not follow with cardiology reports her primary care will tell her she has \"regurgitation\" and has debated sending her cardiology however has not.  No murmur noted on physical exam today, patient is asymptomatic. BP today is 137/85. METS is >4 and scheduled for non-cardiac surgery.  Echocardiograms prior to surgery for further evaluation of murmur.  Addendum 4/16/25-echo noted below, no further perioperative intervention    METS are 9.9    RCRI  0 which is 3.9% 30 day risk of MACE (risk for cardiac death, nonfatal myocardial infarction, and nonfactal cardiac arrest    CHRISTOPHER score which indicates a   0.2 % risk of intraoperative or 30-day postoperative MACE    Echo 4/15/25  CONCLUSIONS:   1. Left ventricular ejection fraction is normal, by visual estimate at 60-65%.   2. Spectral Doppler shows a Grade I (impaired relaxation pattern) of left ventricular diastolic filling with normal left atrial filling pressure.   3. There is normal right ventricular global systolic function.          Pulmonary:    No Pulmonary diagnosis or significant findings on chart review or clinical presentation and evaluation. Preoperative deep breathing educational handout provided to patient.    No pulmonary diagnosis, however patient is at increased risk of perioperative complications " secondary to  obesity, duration of surgery > 2 hours, types of anesthetic    ARISCAT:    26  points which is a intermediate (13.3%) risk of in-hospital post-op pulmonary complications     PRODIGY:  0  points which is a low risk of post op opioid induced respiratory depression episodes    STOP BAN   points which is a low risk for moderate to severe ALANIS    Pumonary toilet education discussed, patient also provided deep breathing exercises and incentive spirometry educational handout      Renal:   No renal diagnosis, however patient is at increase risk for perioperative renal complications secondary to  BMI equal to or greater than 30       Endocrine:   No endocrine diagnosis or significant findings on chart review or clinical presentation and evaluation. No further testing or intervention is indicated at this time.      Hematologic:      Preoperative DVT educational handout provided to patient.  No hematologic diagnosis, however patient is at an increased risk for DVT  Caprini Score:  9  points which is a highest risk of perioperative VTE      Gastrointestinal:   #GERD, shatzki ring, -medicated with pantoprazole (continue), follows with PCP.  Reports is well-controlled no further need for perioperative intervention    EAT-10 score of    0  : self-perceived oropharyngeal dysphagia scale (0-40)     Apfel: 3 points 61% risk for post operative N/V      Infectious disease:     No infectious diagnosis or significant findings on chart review or clinical presentation and evaluation.   Prescription provided for CHG body wash and dental rinse. CHG use instructions reviewed and provided to patient.  Staph screen collected      Musculoskeletal:    No diagnosis or significant findings on chart review or clinical presentation and evaluation.       Other:     Hold all vitamins and supplements 7 days prior to surgery  Tylenol okay to continue, please hold Aleve/naproxen/ibuprofen (NSAIDs) for 7 days prior to surgery  Continue  valacyclovir and claritin PRN      Labs ordered  Recent Results (from the past 3 weeks)   CBC    Collection Time: 04/10/25  8:01 AM   Result Value Ref Range    WBC 5.6 4.4 - 11.3 x10*3/uL    nRBC 0.0 0.0 - 0.0 /100 WBCs    RBC 4.24 4.00 - 5.20 x10*6/uL    Hemoglobin 12.7 12.0 - 16.0 g/dL    Hematocrit 39.2 36.0 - 46.0 %    MCV 93 80 - 100 fL    MCH 30.0 26.0 - 34.0 pg    MCHC 32.4 32.0 - 36.0 g/dL    RDW 12.8 11.5 - 14.5 %    Platelets 329 150 - 450 x10*3/uL   Basic Metabolic Panel    Collection Time: 04/10/25  8:01 AM   Result Value Ref Range    Glucose 126 (H) 74 - 99 mg/dL    Sodium 138 136 - 145 mmol/L    Potassium 4.4 3.5 - 5.3 mmol/L    Chloride 105 98 - 107 mmol/L    Bicarbonate 21 21 - 32 mmol/L    Anion Gap 16 10 - 20 mmol/L    Urea Nitrogen 14 6 - 23 mg/dL    Creatinine 0.66 0.50 - 1.05 mg/dL    eGFR >90 >60 mL/min/1.73m*2    Calcium 9.2 8.6 - 10.6 mg/dL   Type And Screen Is this order related to pregnancy or an upcoming surgery? Yes; Where will this surgery/delivery be performed? [facility]; What is the date of the surgery? 4/21/2025; Has this patient ever had a transfusion? No; Has t...    Collection Time: 04/10/25  8:01 AM   Result Value Ref Range    ABO TYPE O     Rh TYPE POS     ANTIBODY SCREEN NEG    Staphylococcus aureus/MRSA colonization, Culture    Collection Time: 04/10/25  8:01 AM    Specimen: Nares/Axilla/Groin; Swab   Result Value Ref Range    Staph/MRSA Screen Culture No Staphylococcus aureus isolated    Transthoracic Echo (TTE) Complete    Collection Time: 04/15/25  9:52 AM   Result Value Ref Range    AV pk rebeka 1.70 m/s    LVOT diam 2.00 cm    MV E/A ratio 1.19     LA vol index A/L 36.2 ml/m2    Tricuspid annular plane systolic excursion 1.7 cm    LV EF 63 %    RV free wall pk S' 12.51 cm/s    LVIDd 5.43 cm    RVSP 24.4 mmHg    Aortic Valve Area by Continuity of Peak Velocity 2.27 cm2    AV pk grad 12 mmHg    LV A4C EF 73.0            Medication instructions and NPO guidelines  reviewed with the patient.  All questions or concerns discussed and addressed.      Leonora Medina PA-C

## 2025-04-10 NOTE — NURSING NOTE
Pt seen in PAT today.  Labs drawn per MINERVA  MRSA, T/S, [facility], CBC    MINERVA requesting STAT ECHO.   Will coordinate and call pt once  scheduled.     DC instructions provided by MINERVA    Echo scheduled for 4/11/25 in [place]   [address]  Pt called and VM left on secure line re: appt.    Pt returned call to this RN, above ECHO 4/11/25 is not possible for her.  Asked to reschedule on either 4/14/25 or 4/15/25.  Contacted Barbie in scheduling.    Updated ECHO scheduled 4/15/25 at 9 am  [address]    LM on pt VM re: this updated appt.  MINERVA updated

## 2025-04-10 NOTE — PREPROCEDURE INSTRUCTIONS
"      Thank you for visiting [facility] ([facility]) today for your pre-procedure evaluation, you were seen by     Leonora Medina PA-C   Department of Anesthesiology and Perioperative Medicine  Main phone [phone]  Fax [phone]      This summary includes instructions and information to aid you during your perioperative period.  Please read carefully. If you have any questions about your visit today, please call the number listed above.  If you become ill or have any changes to your health before your surgery, please contact your primary care provider and alert your surgeon.    Preparing for Surgery       Preoperative Fasting Guidelines    Why must I stop eating and drinking near surgery time?  With sedation, food or liquid in your stomach can enter your lungs causing serious complications  Food can increase nausea and vomiting  When do I need to stop eating and drinking before my surgery?  Do not eat any food after midnight the night before your surgery/procedure.  You may have up to 13.5 ounces of clear liquid until TWO hours before your instructed arrival time to the hospital.  This includes water, black tea/coffee, (no milk or cream) apple juice, and electrolyte drinks (Gatorade)  You may chew gum until TWO hours before your surgery/procedure    Tobacco and Alcohol;  Do not drink alcohol or smoke within 24 hours of surgery.  It is best to quit smoking for as long as possible before any surgery or procedure.     Other Instructions  Why did I have my nose, under my arms, and groin swabbed? The purpose of the swab is to identify Staphylococcus aureus inside your nose or on your skin.  The swab was sent to the laboratory for culture.  A positive swab/culture for Staphylococcus aureus is called colonization or carriage.   What is Staphylococcus aureus? Staphylococcus aureus, also known as \"staph\", is a germ found on the skin or in the nose of healthy people.  Sometimes Staphylococcus aureus " can get into the body and cause an infection.  This can be minor (such as pimples, boils, or other skin problems).  It might also be serious (such as a blood infection, pneumonia, or a surgical site infection). What is Staphylococcus aureus colonization or carriage? Colonization or carriage means that a person has the germ but is not sick from it.  These bacteria can be spread on the hands or when breathing or sneezing. How is Staphylococcus aureus spread? It is most often spread by close contact with a person or item that carries it. What happens if my culture is positive for Staphylococcus aureus? Your doctor/medical team will use this information to guide any antibiotic treatment which may be necessary.  Regardless of the culture results, we will clean the inside of your nose with a betadine swab just before you have your surgery. Will I get an infection if I have Staphylococcus aureus in my nose or on my skin? Anyone can get an infection with Staphylococcus aureus.  However, the best way to reduce your risk of infection is to follow the instructions provided to you for the use of your CHG soap and dental rinse.  , Body Wash; What is a home preoperative antibacterial shower? This shower is a way of cleaning the skin with a germ-killing solution before surgery.  The solution contains chlorhexidine, commonly known as CHG.  CHG is a skin cleanser with germ-killing ability.  Let your doctor know if you are allergic to chlorhexidine. Why do I need to take a preoperative antibacterial shower? Skin is not sterile.  It is best to try to make your skin as free of germs as possible before surgery.  Proper cleansing with a germ-killing soap before surgery can lower the number of germs on your skin.  This helps to reduce the risk of infection at the surgical site.  Following the instructions listed below will help you prepare your skin for surgery.   How do I use the solution? Steps:  Begin using your CHG soap 5 days before  your scheduled surgery on ___________.   First, wash and rinse your hair using the CHG soap. Keep CHG soap away from ear canals and eyes.  Rinse completely, do not condition.  Hair extensions should be removed. , Oral/Dental Rinse: What is oral/dental rinse?  It is a mouthwash. It is a way of cleaning the mouth with a germ-killing solution before your surgery.  The solution contains chlorhexidine, commonly known as CHG. It is used inside the mouth to kill a bacteria known as Staphylococcus aureus.  Let your doctor know if you are allergic to Chlorhexidine. Why do I need to use CHG oral/dental rinse? The CHG oral/dental rinse helps to kill a bacteria in your mouth known as Staphylococcus aureus.  This reduces the risk of infection at the surgical site.  Using your CHG oral/dental rinse STEPS: Use your CHG oral/dental rinse after you brush your teeth the night before (at bedtime) and the morning of your surgery.  Follow all directions on your prescription label.  Use the cap on the container to measure 15 ml.  Swish (gargle if you can) the mouthwash in your mouth for at least 30 seconds, (do not swallow) and spit out.  After you use your CHG rinse, do not rinse your mouth with water, drink or eat.  Please refer to the prescription label for the appropriate time to resume oral intake What side effects might I have using the CHG oral/dental rinse? CHG rinse will stick to plaque on the teeth.  Brush and floss just before use.  Teeth brushing will help avoid staining of plaque during use.       The Week before Surgery        Seven days before Surgery  Check your CPM medication instructions  Do the exercises provided to you by CPM   Arrange for a responsible, adult licensed  to take you home after surgery and stay with you for 24 hours.  You will not be permitted to drive yourself home if you have received any anesthetic/sedation  Five days before surgery  Check your CPM medication instructions  Do the exercises  provided to you by CPM   Start using Chlorhexidene (CHG) body wash if prescribed (Continue till day of surgery)      The Day before Surgery       Check your CPM medication and all other CPM instructions including when to stop eating and drinking  You will be called with your arrival time for surgery in the late afternoon.  If you do not receive a call please reach out to your surgeon's office.  Do not smoke or drink 24 hours before surgery  Prepare items to bring with you to the hospital  Shower with your chlorhexidine wash if prescribed  Brush your teeth and use your chlorhexidine dental rinse if prescribed    The Day of Surgery       Check your CPM medication instructions  Ensure you follow the instructions for when to stop eating and drinking  Shower, if prescribed use CHG.  Do not apply any lotions, creams, moisturizers, perfume or deodorant  Brush your teeth and use your CHG dental rinse if prescribed  Wear loose comfortable clothing  Avoid make-up  Remove  jewelry and piercings, consider professional piercing removal with a plastic spacer if needed  Bring photo ID and Insurance card  Bring an accurate medication list that includes medication dose, frequency and allergies  Bring a copy of your advanced directives (will, health care power of )  Bring any devices and controllers as well as medical devices you have been provided with for surgery (CPAP, slings, braces, etc.)  Dentures, eyeglasses, and contacts will be removed before surgery, please bring cases for contacts or glasses    Preoperative Deep Breathing Exercises    Why it is important to do deep breathing exercises before my surgery?  Deep breathing exercises strengthen your breathing muscles.  This helps you to recover after your surgery and decreases the chance of breathing complications.    How are the deep breathing exercises done?  Sit straight with your back supported.  Breathe in deeply and slowly through your nose. Your lower rib cage  should expand and your abdomen may move forward.  Hold that breath for 3 to 5 seconds.  Breathe out through pursed lips, slowly and completely.  Rest and repeat 10 times every hour while awake.  Rest longer if you become dizzy or lightheaded.      Preoperative Brain Exercises    What are brain exercises?  A brain exercise is any activity that engages your thinking (cognitive) skills.    What types of activities are considered brain exercises?  Jigsaw puzzles, crossword puzzles, word jumble, memory games, word search, and many more.  Many can be found free online or on your phone via a mobile kendra.    Why should I do brain exercises before my surgery?  More recent research has shown brain exercise before surgery can lower the risk of postoperative delirium (confusion) which can be especially important for older adults.  Patients who did brain exercises for 5 to 10 hours the days before surgery, cut their risk of postoperative delirium in half up to 1 week after surgery.    Sit-to-Stand Exercise    What is the sit-to-stand exercise?  The sit-to-stand exercise strengthens the muscles of your lower body and muscles in the center of your body (core muscles for stability) helping to maintain and improve your strength and mobility.  How do I do the sit-to-stand exercise?  The goal is to do this exercise without using your arms or hands.  If this is too difficult, use your arms and hands or a chair with armrests to help slowly push yourself to the standing position and lower yourself back to the sitting position. As the movement becomes easier use your arms and hands less.    Steps to the sit-to-stand exercise  Sit up tall in a sturdy chair, knees bent, feet flat on the floor shoulder-width apart.  Shift your hips/pelvis forward in the chair to correctly position yourself for the next movement.  Lean forward at your hips.  Stand up straight putting equal weight on both feet.  Check to be sure you are properly aligned with  the chair, in a slow controlled movement sit back down.  Repeat this exercise 10-15 times.  If needed you can do it fewer times until your strength improves.  Rest for 1 minute.  Do another 10-15 sit-to-stand exercises.  Try to do this in the morning and evening.       Simple things you can do to help prevent blood clots     Blood clots are blockages that can form in the body's veins. When a blood clot forms in your deep veins, it may be called a deep vein thrombosis, or DVT for short. Blood clots can happen in any part of the body where blood flows, but they are most common in the arms and legs. If a piece of a blood clot breaks free and travels to the lungs, it is called a pulmonary embolus (PE). A PE can be a very serious problem.      Being in the hospital or having surgery can raise your chances of getting a blood clot because you may not be well enough to move around as much as you normally do.         Ways you can help prevent blood clots in the hospital       Wearing SCDs  SCDs stands for Sequential Compression Devices.   SCDs are special sleeves that wrap around your legs. They attach to a pump that fills them with air to gently squeeze your legs every few minutes.  This helps return the blood in your legs to your heart.   SCDs should only be taken off when walking or bathing. SCDs may not be comfortable, but they can help save your life.              Pump SCD leg sleeves  Wearing compression stockings - if your doctor orders them. These special snug-fitting stockings gently squeeze your legs to help blood flow.       Walking. Walking helps move the blood in your legs.   If your doctor says it is ok, try walking the halls at least   5 times a day. Ask us to help you get up, so you don't fall.      Taking any blood-thinning medicines your doctor orders.              Ways you can help prevent blood clots at home         Wearing compression stockings - if your doctor orders them.   Walking - to help move the  blood in your legs.    Taking any blood-thinning medicines your doctor orders.      Signs of a blood clot or PE    Tell your doctor or nurse right away if you have any of the problems listed below.         If you are at home, seek medical care right away. Call 911 for chest pain or problems breathing.            Signs of a blood clot (DVT) - such as pain, swelling, redness, or warmth in your arm or legs.  Signs of a pulmonary embolism (PE) - such as chest pain or feeling short of breath

## 2025-04-11 ENCOUNTER — APPOINTMENT (OUTPATIENT)
Dept: CARDIOLOGY | Facility: CLINIC | Age: 50
End: 2025-04-11
Payer: COMMERCIAL

## 2025-04-11 LAB — STAPHYLOCOCCUS SPEC CULT: NORMAL

## 2025-04-15 ENCOUNTER — HOSPITAL ENCOUNTER (OUTPATIENT)
Dept: CARDIOLOGY | Facility: CLINIC | Age: 50
Discharge: HOME | End: 2025-04-15
Payer: COMMERCIAL

## 2025-04-15 DIAGNOSIS — I34.1 MVP (MITRAL VALVE PROLAPSE): ICD-10-CM

## 2025-04-15 DIAGNOSIS — Z01.818 PREOPERATIVE EXAMINATION: ICD-10-CM

## 2025-04-15 LAB
AORTIC VALVE PEAK VELOCITY: 1.7 M/S
AV PEAK GRADIENT: 12 MMHG
AVA (PEAK VEL): 2.27 CM2
EJECTION FRACTION APICAL 4 CHAMBER: 73
EJECTION FRACTION: 63 %
LEFT ATRIUM VOLUME AREA LENGTH INDEX BSA: 36.2 ML/M2
LEFT VENTRICLE INTERNAL DIMENSION DIASTOLE: 5.43 CM (ref 3.5–6)
LEFT VENTRICULAR OUTFLOW TRACT DIAMETER: 2 CM
MITRAL VALVE E/A RATIO: 1.19
RIGHT VENTRICLE FREE WALL PEAK S': 12.51 CM/S
RIGHT VENTRICLE PEAK SYSTOLIC PRESSURE: 24.4 MMHG
TRICUSPID ANNULAR PLANE SYSTOLIC EXCURSION: 1.7 CM

## 2025-04-15 PROCEDURE — 93306 TTE W/DOPPLER COMPLETE: CPT | Performed by: INTERNAL MEDICINE

## 2025-04-15 PROCEDURE — 93306 TTE W/DOPPLER COMPLETE: CPT

## 2025-04-18 ENCOUNTER — ANESTHESIA EVENT (OUTPATIENT)
Dept: OPERATING ROOM | Facility: HOSPITAL | Age: 50
DRG: 027 | End: 2025-04-18
Payer: COMMERCIAL

## 2025-04-18 NOTE — PROGRESS NOTES
Pharmacy Medication History Review    Erica Wayne is a 49 y.o. female who is planned to be admitted for Meningioma (Multi). Pharmacy called the patient prior to their scheduled procedure and reviewed the patient's ghvja-od-bpyxrvmil medications for accuracy.    Medications ADDED:  none  Medications CHANGED:  none  Medications REMOVED:   none    Please review updated prior to admission medication list and comments regarding how patient may be taking medications differently by going to Admission tab --> Admission Orders --> Admit Orders / Review prior to admission medications.     Preferred pharmacy, last doses of medications, and allergies to be confirmed with patient by nursing the day of procedure.     Sources used to complete the med history include:  [facility]  Pharmacy dispense history  Patient Interview Good historian  Chart Review  Care Everywhere     Below are additional concerns with the patient's PTA list.  Patient states they stopped vitamins in preparation of procedure  Patient states they take #1 tablet of claritin 10mg as needed, does not take daily. And is on hold in preparation of procedure    Cristina Morgan [facility]  Please reach out via Secure Chat for questions

## 2025-04-21 ENCOUNTER — APPOINTMENT (OUTPATIENT)
Dept: RADIOLOGY | Facility: HOSPITAL | Age: 50
DRG: 027 | End: 2025-04-21
Payer: COMMERCIAL

## 2025-04-21 ENCOUNTER — ANESTHESIA (OUTPATIENT)
Dept: OPERATING ROOM | Facility: HOSPITAL | Age: 50
DRG: 027 | End: 2025-04-21
Payer: COMMERCIAL

## 2025-04-21 ENCOUNTER — HOSPITAL ENCOUNTER (OUTPATIENT)
Dept: NEUROLOGY | Facility: HOSPITAL | Age: 50
Setting detail: SURGERY ADMIT
Discharge: HOME | DRG: 027 | End: 2025-04-21
Payer: COMMERCIAL

## 2025-04-21 PROBLEM — R51.9 CHRONIC HEADACHES: Status: ACTIVE | Noted: 2025-04-21

## 2025-04-21 PROBLEM — G89.29 CHRONIC HEADACHES: Status: ACTIVE | Noted: 2025-04-21

## 2025-04-21 LAB
ABO GROUP (TYPE) IN BLOOD: NORMAL
ANION GAP BLDA CALCULATED.4IONS-SCNC: 10 MMO/L (ref 10–25)
BASE EXCESS BLDA CALC-SCNC: 0.5 MMOL/L (ref -2–3)
BODY TEMPERATURE: 37 DEGREES CELSIUS
CA-I BLDA-SCNC: 1.18 MMOL/L (ref 1.1–1.33)
CHLORIDE BLDA-SCNC: 105 MMOL/L (ref 98–107)
COHGB MFR BLDA: 0.7 %
DO-HGB MFR BLDA: 0.7 % (ref 0–5)
GLUCOSE BLDA-MCNC: 95 MG/DL (ref 74–99)
HCO3 BLDA-SCNC: 24.6 MMOL/L (ref 22–26)
HCT VFR BLD EST: 38 % (ref 36–46)
HGB BLDA-MCNC: 12.5 G/DL (ref 12–16)
HGB BLDA-MCNC: 12.5 G/DL (ref 12–16)
INHALED O2 CONCENTRATION: 60 %
LACTATE BLDA-SCNC: 1.1 MMOL/L (ref 0.4–2)
METHGB MFR BLDA: 0.4 % (ref 0–1.5)
OXYHGB MFR BLDA: 98.3 % (ref 94–98)
OXYHGB MFR BLDA: 98.3 % (ref 94–98)
PCO2 BLDA: 37 MM HG (ref 38–42)
PH BLDA: 7.43 PH (ref 7.38–7.42)
PO2 BLDA: 352 MM HG (ref 85–95)
POTASSIUM BLDA-SCNC: 4.1 MMOL/L (ref 3.5–5.3)
PREGNANCY TEST URINE, POC: NEGATIVE
RH FACTOR (ANTIGEN D): NORMAL
SAO2 % BLDA: 99 % (ref 94–100)
SODIUM BLDA-SCNC: 135 MMOL/L (ref 136–145)

## 2025-04-21 PROCEDURE — C1713 ANCHOR/SCREW BN/BN,TIS/BN: HCPCS | Performed by: NEUROLOGICAL SURGERY

## 2025-04-21 PROCEDURE — 81025 URINE PREGNANCY TEST: CPT | Performed by: STUDENT IN AN ORGANIZED HEALTH CARE EDUCATION/TRAINING PROGRAM

## 2025-04-21 PROCEDURE — 2500000004 HC RX 250 GENERAL PHARMACY W/ HCPCS (ALT 636 FOR OP/ED): Performed by: REGISTERED NURSE

## 2025-04-21 PROCEDURE — 3600000010 HC OR TIME - EACH INCREMENTAL 1 MINUTE - PROCEDURE LEVEL FIVE: Performed by: NEUROLOGICAL SURGERY

## 2025-04-21 PROCEDURE — 8E09XBZ COMPUTER ASSISTED PROCEDURE OF HEAD AND NECK REGION: ICD-10-PCS | Performed by: NEUROLOGICAL SURGERY

## 2025-04-21 PROCEDURE — 2780000003 HC OR 278 NO HCPCS: Performed by: NEUROLOGICAL SURGERY

## 2025-04-21 PROCEDURE — C1763 CONN TISS, NON-HUMAN: HCPCS | Performed by: NEUROLOGICAL SURGERY

## 2025-04-21 PROCEDURE — 3700000001 HC GENERAL ANESTHESIA TIME - INITIAL BASE CHARGE: Performed by: NEUROLOGICAL SURGERY

## 2025-04-21 PROCEDURE — 36415 COLL VENOUS BLD VENIPUNCTURE: CPT | Performed by: STUDENT IN AN ORGANIZED HEALTH CARE EDUCATION/TRAINING PROGRAM

## 2025-04-21 PROCEDURE — 2720000007 HC OR 272 NO HCPCS: Performed by: NEUROLOGICAL SURGERY

## 2025-04-21 PROCEDURE — 61781 SCAN PROC CRANIAL INTRA: CPT | Performed by: NEUROLOGICAL SURGERY

## 2025-04-21 PROCEDURE — 2500000004 HC RX 250 GENERAL PHARMACY W/ HCPCS (ALT 636 FOR OP/ED)

## 2025-04-21 PROCEDURE — 2020000001 HC ICU ROOM DAILY

## 2025-04-21 PROCEDURE — 70450 CT HEAD/BRAIN W/O DYE: CPT | Performed by: RADIOLOGY

## 2025-04-21 PROCEDURE — 61512 CRNEC TREPH EXC MNGIOMA STTL: CPT | Performed by: NEUROLOGICAL SURGERY

## 2025-04-21 PROCEDURE — 2500000005 HC RX 250 GENERAL PHARMACY W/O HCPCS: Performed by: NEUROLOGICAL SURGERY

## 2025-04-21 PROCEDURE — 2500000004 HC RX 250 GENERAL PHARMACY W/ HCPCS (ALT 636 FOR OP/ED): Mod: JZ | Performed by: NEUROLOGICAL SURGERY

## 2025-04-21 PROCEDURE — 2500000001 HC RX 250 WO HCPCS SELF ADMINISTERED DRUGS (ALT 637 FOR MEDICARE OP): Performed by: NEUROLOGICAL SURGERY

## 2025-04-21 PROCEDURE — 2500000004 HC RX 250 GENERAL PHARMACY W/ HCPCS (ALT 636 FOR OP/ED): Mod: JZ | Performed by: NURSE ANESTHETIST, CERTIFIED REGISTERED

## 2025-04-21 PROCEDURE — 99291 CRITICAL CARE FIRST HOUR: CPT | Performed by: REGISTERED NURSE

## 2025-04-21 PROCEDURE — 00B10ZZ EXCISION OF CEREBRAL MENINGES, OPEN APPROACH: ICD-10-PCS | Performed by: NEUROLOGICAL SURGERY

## 2025-04-21 PROCEDURE — 85018 HEMOGLOBIN: CPT

## 2025-04-21 PROCEDURE — 70450 CT HEAD/BRAIN W/O DYE: CPT

## 2025-04-21 PROCEDURE — 3600000005 HC OR TIME - INITIAL BASE CHARGE - PROCEDURE LEVEL FIVE: Performed by: NEUROLOGICAL SURGERY

## 2025-04-21 PROCEDURE — 82435 ASSAY OF BLOOD CHLORIDE: CPT

## 2025-04-21 PROCEDURE — A61519 PR EXCIS INFRATENT MENINGIOMA: Performed by: NURSE ANESTHETIST, CERTIFIED REGISTERED

## 2025-04-21 PROCEDURE — 2500000004 HC RX 250 GENERAL PHARMACY W/ HCPCS (ALT 636 FOR OP/ED): Mod: JZ | Performed by: STUDENT IN AN ORGANIZED HEALTH CARE EDUCATION/TRAINING PROGRAM

## 2025-04-21 PROCEDURE — 95955 EEG DURING SURGERY: CPT

## 2025-04-21 PROCEDURE — 3700000002 HC GENERAL ANESTHESIA TIME - EACH INCREMENTAL 1 MINUTE: Performed by: NEUROLOGICAL SURGERY

## 2025-04-21 PROCEDURE — 0NR00JZ REPLACEMENT OF SKULL WITH SYNTHETIC SUBSTITUTE, OPEN APPROACH: ICD-10-PCS | Performed by: NEUROLOGICAL SURGERY

## 2025-04-21 PROCEDURE — 36620 INSERTION CATHETER ARTERY: CPT | Performed by: NURSE ANESTHETIST, CERTIFIED REGISTERED

## 2025-04-21 PROCEDURE — 2500000004 HC RX 250 GENERAL PHARMACY W/ HCPCS (ALT 636 FOR OP/ED): Performed by: NEUROLOGICAL SURGERY

## 2025-04-21 PROCEDURE — A61519 PR EXCIS INFRATENT MENINGIOMA: Performed by: STUDENT IN AN ORGANIZED HEALTH CARE EDUCATION/TRAINING PROGRAM

## 2025-04-21 PROCEDURE — 88307 TISSUE EXAM BY PATHOLOGIST: CPT | Mod: TC,SUR | Performed by: NURSE PRACTITIONER

## 2025-04-21 DEVICE — IMPLANTABLE DEVICE: Type: IMPLANTABLE DEVICE | Site: BRAIN | Status: FUNCTIONAL

## 2025-04-21 DEVICE — DURAGEN® PLUS DURAL REGENERATION MATRIX, 3 IN X 3 IN (7.5 CM X 7.5 CM)
Type: IMPLANTABLE DEVICE | Site: CRANIAL | Status: FUNCTIONAL
Brand: DURAGEN® PLUS

## 2025-04-21 DEVICE — CROSS PIN, AXS SELF-TAP, 1.5 X 4MM: Type: IMPLANTABLE DEVICE | Site: BRAIN | Status: FUNCTIONAL

## 2025-04-21 RX ORDER — OXYCODONE AND ACETAMINOPHEN 5; 325 MG/1; MG/1
1 TABLET ORAL EVERY 4 HOURS PRN
Status: DISCONTINUED | OUTPATIENT
Start: 2025-04-21 | End: 2025-04-21 | Stop reason: HOSPADM

## 2025-04-21 RX ORDER — PHENYLEPHRINE HCL IN 0.9% NACL 1 MG/10 ML
SYRINGE (ML) INTRAVENOUS AS NEEDED
Status: DISCONTINUED | OUTPATIENT
Start: 2025-04-21 | End: 2025-04-21

## 2025-04-21 RX ORDER — HEPARIN SODIUM 5000 [USP'U]/ML
5000 INJECTION, SOLUTION INTRAVENOUS; SUBCUTANEOUS EVERY 8 HOURS
Status: DISCONTINUED | OUTPATIENT
Start: 2025-04-23 | End: 2025-04-23 | Stop reason: HOSPADM

## 2025-04-21 RX ORDER — HYDROMORPHONE HYDROCHLORIDE 1 MG/ML
INJECTION, SOLUTION INTRAMUSCULAR; INTRAVENOUS; SUBCUTANEOUS AS NEEDED
Status: DISCONTINUED | OUTPATIENT
Start: 2025-04-21 | End: 2025-04-21

## 2025-04-21 RX ORDER — ONDANSETRON HYDROCHLORIDE 2 MG/ML
4 INJECTION, SOLUTION INTRAVENOUS ONCE AS NEEDED
Status: DISCONTINUED | OUTPATIENT
Start: 2025-04-21 | End: 2025-04-21 | Stop reason: HOSPADM

## 2025-04-21 RX ORDER — SODIUM CHLORIDE, SODIUM LACTATE, POTASSIUM CHLORIDE, CALCIUM CHLORIDE 600; 310; 30; 20 MG/100ML; MG/100ML; MG/100ML; MG/100ML
50 INJECTION, SOLUTION INTRAVENOUS CONTINUOUS
Status: DISCONTINUED | OUTPATIENT
Start: 2025-04-21 | End: 2025-04-21 | Stop reason: HOSPADM

## 2025-04-21 RX ORDER — LABETALOL HYDROCHLORIDE 5 MG/ML
10 INJECTION, SOLUTION INTRAVENOUS EVERY 10 MIN PRN
Status: DISCONTINUED | OUTPATIENT
Start: 2025-04-21 | End: 2025-04-23 | Stop reason: HOSPADM

## 2025-04-21 RX ORDER — FENTANYL CITRATE 50 UG/ML
12.5 INJECTION, SOLUTION INTRAMUSCULAR; INTRAVENOUS EVERY 5 MIN PRN
Status: DISCONTINUED | OUTPATIENT
Start: 2025-04-21 | End: 2025-04-21 | Stop reason: HOSPADM

## 2025-04-21 RX ORDER — CHOLECALCIFEROL (VITAMIN D3) 25 MCG
50 TABLET ORAL DAILY
Status: DISCONTINUED | OUTPATIENT
Start: 2025-04-21 | End: 2025-04-23 | Stop reason: HOSPADM

## 2025-04-21 RX ORDER — OXYCODONE HYDROCHLORIDE 5 MG/1
2.5 TABLET ORAL EVERY 4 HOURS PRN
Status: DISCONTINUED | OUTPATIENT
Start: 2025-04-21 | End: 2025-04-23 | Stop reason: HOSPADM

## 2025-04-21 RX ORDER — SODIUM CHLORIDE 9 MG/ML
75 INJECTION, SOLUTION INTRAVENOUS CONTINUOUS
Status: DISCONTINUED | OUTPATIENT
Start: 2025-04-21 | End: 2025-04-22

## 2025-04-21 RX ORDER — MIDAZOLAM HYDROCHLORIDE 1 MG/ML
INJECTION INTRAMUSCULAR; INTRAVENOUS AS NEEDED
Status: DISCONTINUED | OUTPATIENT
Start: 2025-04-21 | End: 2025-04-21

## 2025-04-21 RX ORDER — PROCHLORPERAZINE EDISYLATE 5 MG/ML
5 INJECTION INTRAMUSCULAR; INTRAVENOUS ONCE
Status: COMPLETED | OUTPATIENT
Start: 2025-04-21 | End: 2025-04-21

## 2025-04-21 RX ORDER — METOCLOPRAMIDE HYDROCHLORIDE 5 MG/ML
10 INJECTION INTRAMUSCULAR; INTRAVENOUS EVERY 6 HOURS PRN
Status: DISCONTINUED | OUTPATIENT
Start: 2025-04-21 | End: 2025-04-23 | Stop reason: HOSPADM

## 2025-04-21 RX ORDER — HYDROMORPHONE HYDROCHLORIDE 0.2 MG/ML
0.2 INJECTION INTRAMUSCULAR; INTRAVENOUS; SUBCUTANEOUS EVERY 5 MIN PRN
Status: DISCONTINUED | OUTPATIENT
Start: 2025-04-21 | End: 2025-04-21 | Stop reason: HOSPADM

## 2025-04-21 RX ORDER — FENTANYL CITRATE 50 UG/ML
INJECTION, SOLUTION INTRAMUSCULAR; INTRAVENOUS AS NEEDED
Status: DISCONTINUED | OUTPATIENT
Start: 2025-04-21 | End: 2025-04-21

## 2025-04-21 RX ORDER — HYDRALAZINE HYDROCHLORIDE 20 MG/ML
INJECTION INTRAMUSCULAR; INTRAVENOUS AS NEEDED
Status: DISCONTINUED | OUTPATIENT
Start: 2025-04-21 | End: 2025-04-21

## 2025-04-21 RX ORDER — ESOMEPRAZOLE MAGNESIUM 40 MG/1
40 GRANULE, DELAYED RELEASE ORAL
Status: DISCONTINUED | OUTPATIENT
Start: 2025-04-21 | End: 2025-04-23 | Stop reason: HOSPADM

## 2025-04-21 RX ORDER — ALBUTEROL SULFATE 0.83 MG/ML
2.5 SOLUTION RESPIRATORY (INHALATION) ONCE AS NEEDED
Status: DISCONTINUED | OUTPATIENT
Start: 2025-04-21 | End: 2025-04-21 | Stop reason: HOSPADM

## 2025-04-21 RX ORDER — DROPERIDOL 2.5 MG/ML
0.62 INJECTION, SOLUTION INTRAMUSCULAR; INTRAVENOUS ONCE AS NEEDED
Status: DISCONTINUED | OUTPATIENT
Start: 2025-04-21 | End: 2025-04-21 | Stop reason: HOSPADM

## 2025-04-21 RX ORDER — LEVETIRACETAM 5 MG/ML
INJECTION INTRAVASCULAR AS NEEDED
Status: DISCONTINUED | OUTPATIENT
Start: 2025-04-21 | End: 2025-04-21

## 2025-04-21 RX ORDER — LABETALOL HYDROCHLORIDE 5 MG/ML
INJECTION, SOLUTION INTRAVENOUS AS NEEDED
Status: DISCONTINUED | OUTPATIENT
Start: 2025-04-21 | End: 2025-04-21

## 2025-04-21 RX ORDER — SODIUM CHLORIDE 0.9 G/100ML
INJECTION, SOLUTION IRRIGATION AS NEEDED
Status: DISCONTINUED | OUTPATIENT
Start: 2025-04-21 | End: 2025-04-21 | Stop reason: HOSPADM

## 2025-04-21 RX ORDER — OXYCODONE HYDROCHLORIDE 5 MG/1
10 TABLET ORAL EVERY 4 HOURS PRN
Status: DISCONTINUED | OUTPATIENT
Start: 2025-04-21 | End: 2025-04-23 | Stop reason: HOSPADM

## 2025-04-21 RX ORDER — PANTOPRAZOLE SODIUM 40 MG/1
40 TABLET, DELAYED RELEASE ORAL
Status: DISCONTINUED | OUTPATIENT
Start: 2025-04-22 | End: 2025-04-21

## 2025-04-21 RX ORDER — OXYCODONE HYDROCHLORIDE 5 MG/1
5 TABLET ORAL EVERY 4 HOURS PRN
Status: DISCONTINUED | OUTPATIENT
Start: 2025-04-21 | End: 2025-04-23 | Stop reason: HOSPADM

## 2025-04-21 RX ORDER — METOCLOPRAMIDE 10 MG/1
10 TABLET ORAL EVERY 6 HOURS PRN
Status: DISCONTINUED | OUTPATIENT
Start: 2025-04-21 | End: 2025-04-23 | Stop reason: HOSPADM

## 2025-04-21 RX ORDER — POLYMYXIN B 500000 [USP'U]/1
INJECTION, POWDER, LYOPHILIZED, FOR SOLUTION INTRAMUSCULAR; INTRATHECAL; INTRAVENOUS; OPHTHALMIC AS NEEDED
Status: DISCONTINUED | OUTPATIENT
Start: 2025-04-21 | End: 2025-04-21 | Stop reason: HOSPADM

## 2025-04-21 RX ORDER — HYDROMORPHONE HYDROCHLORIDE 0.2 MG/ML
0.2 INJECTION INTRAMUSCULAR; INTRAVENOUS; SUBCUTANEOUS EVERY 4 HOURS PRN
Status: DISCONTINUED | OUTPATIENT
Start: 2025-04-21 | End: 2025-04-23 | Stop reason: HOSPADM

## 2025-04-21 RX ORDER — LIDOCAINE HYDROCHLORIDE 10 MG/ML
0.1 INJECTION, SOLUTION INFILTRATION; PERINEURAL ONCE
Status: DISCONTINUED | OUTPATIENT
Start: 2025-04-21 | End: 2025-04-21 | Stop reason: HOSPADM

## 2025-04-21 RX ORDER — ESMOLOL HYDROCHLORIDE 10 MG/ML
INJECTION INTRAVENOUS AS NEEDED
Status: DISCONTINUED | OUTPATIENT
Start: 2025-04-21 | End: 2025-04-21

## 2025-04-21 RX ORDER — LIDOCAINE HCL/PF 100 MG/5ML
SYRINGE (ML) INTRAVENOUS AS NEEDED
Status: DISCONTINUED | OUTPATIENT
Start: 2025-04-21 | End: 2025-04-21

## 2025-04-21 RX ORDER — PANTOPRAZOLE SODIUM 40 MG/1
40 TABLET, DELAYED RELEASE ORAL
Status: DISCONTINUED | OUTPATIENT
Start: 2025-04-21 | End: 2025-04-23 | Stop reason: HOSPADM

## 2025-04-21 RX ORDER — CALCIUM CARBONATE 200(500)MG
1000 TABLET,CHEWABLE ORAL 4 TIMES DAILY PRN
Status: DISCONTINUED | OUTPATIENT
Start: 2025-04-21 | End: 2025-04-23 | Stop reason: HOSPADM

## 2025-04-21 RX ORDER — PHENYLEPHRINE 10 MG/250 ML(40 MCG/ML)IN 0.9 % SOD.CHLORIDE INTRAVENOUS
CONTINUOUS PRN
Status: DISCONTINUED | OUTPATIENT
Start: 2025-04-21 | End: 2025-04-21

## 2025-04-21 RX ORDER — ONDANSETRON HYDROCHLORIDE 2 MG/ML
INJECTION, SOLUTION INTRAVENOUS AS NEEDED
Status: DISCONTINUED | OUTPATIENT
Start: 2025-04-21 | End: 2025-04-21

## 2025-04-21 RX ORDER — LEVETIRACETAM 500 MG/1
500 TABLET ORAL 2 TIMES DAILY
Status: DISCONTINUED | OUTPATIENT
Start: 2025-04-21 | End: 2025-04-23 | Stop reason: HOSPADM

## 2025-04-21 RX ORDER — PROPOFOL 10 MG/ML
INJECTION, EMULSION INTRAVENOUS CONTINUOUS PRN
Status: DISCONTINUED | OUTPATIENT
Start: 2025-04-21 | End: 2025-04-21

## 2025-04-21 RX ORDER — NALOXONE HYDROCHLORIDE 0.4 MG/ML
0.2 INJECTION, SOLUTION INTRAMUSCULAR; INTRAVENOUS; SUBCUTANEOUS EVERY 5 MIN PRN
Status: DISCONTINUED | OUTPATIENT
Start: 2025-04-21 | End: 2025-04-23 | Stop reason: HOSPADM

## 2025-04-21 RX ORDER — POLYETHYLENE GLYCOL 3350 17 G/17G
17 POWDER, FOR SOLUTION ORAL DAILY
Status: DISCONTINUED | OUTPATIENT
Start: 2025-04-21 | End: 2025-04-23 | Stop reason: HOSPADM

## 2025-04-21 RX ORDER — SCOPOLAMINE 1 MG/3D
1 PATCH, EXTENDED RELEASE TRANSDERMAL
Status: DISCONTINUED | OUTPATIENT
Start: 2025-04-21 | End: 2025-04-23 | Stop reason: HOSPADM

## 2025-04-21 RX ORDER — LABETALOL HYDROCHLORIDE 5 MG/ML
5 INJECTION, SOLUTION INTRAVENOUS ONCE AS NEEDED
Status: DISCONTINUED | OUTPATIENT
Start: 2025-04-21 | End: 2025-04-21 | Stop reason: HOSPADM

## 2025-04-21 RX ORDER — SODIUM CHLORIDE, SODIUM GLUCONATE, SODIUM ACETATE, POTASSIUM CHLORIDE AND MAGNESIUM CHLORIDE 30; 37; 368; 526; 502 MG/100ML; MG/100ML; MG/100ML; MG/100ML; MG/100ML
INJECTION, SOLUTION INTRAVENOUS CONTINUOUS PRN
Status: DISCONTINUED | OUTPATIENT
Start: 2025-04-21 | End: 2025-04-21

## 2025-04-21 RX ORDER — ONDANSETRON 4 MG/1
4 TABLET, FILM COATED ORAL EVERY 8 HOURS PRN
Status: DISCONTINUED | OUTPATIENT
Start: 2025-04-21 | End: 2025-04-23 | Stop reason: HOSPADM

## 2025-04-21 RX ORDER — SODIUM CHLORIDE 9 MG/ML
INJECTION, SOLUTION INTRAVENOUS CONTINUOUS PRN
Status: DISCONTINUED | OUTPATIENT
Start: 2025-04-21 | End: 2025-04-21

## 2025-04-21 RX ORDER — CEFAZOLIN 1 G/1
INJECTION, POWDER, FOR SOLUTION INTRAVENOUS AS NEEDED
Status: DISCONTINUED | OUTPATIENT
Start: 2025-04-21 | End: 2025-04-21

## 2025-04-21 RX ORDER — MANNITOL 20 G/100ML
INJECTION, SOLUTION INTRAVENOUS AS NEEDED
Status: DISCONTINUED | OUTPATIENT
Start: 2025-04-21 | End: 2025-04-21

## 2025-04-21 RX ORDER — PANTOPRAZOLE SODIUM 40 MG/10ML
40 INJECTION, POWDER, LYOPHILIZED, FOR SOLUTION INTRAVENOUS
Status: DISCONTINUED | OUTPATIENT
Start: 2025-04-21 | End: 2025-04-23 | Stop reason: HOSPADM

## 2025-04-21 RX ORDER — ROCURONIUM BROMIDE 10 MG/ML
INJECTION, SOLUTION INTRAVENOUS AS NEEDED
Status: DISCONTINUED | OUTPATIENT
Start: 2025-04-21 | End: 2025-04-21

## 2025-04-21 RX ORDER — CETIRIZINE HYDROCHLORIDE 10 MG/1
10 TABLET ORAL DAILY PRN
Status: DISCONTINUED | OUTPATIENT
Start: 2025-04-21 | End: 2025-04-23 | Stop reason: HOSPADM

## 2025-04-21 RX ORDER — ONDANSETRON HYDROCHLORIDE 2 MG/ML
4 INJECTION, SOLUTION INTRAVENOUS EVERY 8 HOURS PRN
Status: DISCONTINUED | OUTPATIENT
Start: 2025-04-21 | End: 2025-04-23 | Stop reason: HOSPADM

## 2025-04-21 RX ORDER — LIDOCAINE HYDROCHLORIDE AND EPINEPHRINE 5; 5 MG/ML; UG/ML
INJECTION, SOLUTION INFILTRATION; PERINEURAL AS NEEDED
Status: DISCONTINUED | OUTPATIENT
Start: 2025-04-21 | End: 2025-04-21 | Stop reason: HOSPADM

## 2025-04-21 RX ORDER — HYDRALAZINE HYDROCHLORIDE 20 MG/ML
10 INJECTION INTRAMUSCULAR; INTRAVENOUS
Status: DISCONTINUED | OUTPATIENT
Start: 2025-04-21 | End: 2025-04-23 | Stop reason: HOSPADM

## 2025-04-21 RX ADMIN — ROCURONIUM 60 MG: 50 INJECTION, SOLUTION INTRAVENOUS at 09:48

## 2025-04-21 RX ADMIN — MIDAZOLAM HYDROCHLORIDE 2 MG: 2 INJECTION, SOLUTION INTRAMUSCULAR; INTRAVENOUS at 09:35

## 2025-04-21 RX ADMIN — ROCURONIUM 40 MG: 50 INJECTION, SOLUTION INTRAVENOUS at 10:14

## 2025-04-21 RX ADMIN — ONDANSETRON 4 MG: 2 INJECTION INTRAMUSCULAR; INTRAVENOUS at 14:53

## 2025-04-21 RX ADMIN — HYDROMORPHONE HYDROCHLORIDE 0.2 MG: 0.2 INJECTION, SOLUTION INTRAMUSCULAR; INTRAVENOUS; SUBCUTANEOUS at 22:44

## 2025-04-21 RX ADMIN — SODIUM CHLORIDE: 9 INJECTION, SOLUTION INTRAVENOUS at 10:00

## 2025-04-21 RX ADMIN — PHENYLEPHRINE-NACL IV SOLUTION 10 MG/250ML-0.9% 0.2 MCG/KG/MIN: 10-0.9/25 SOLUTION at 10:27

## 2025-04-21 RX ADMIN — SODIUM CHLORIDE 75 ML/HR: 0.9 INJECTION, SOLUTION INTRAVENOUS at 14:54

## 2025-04-21 RX ADMIN — ROCURONIUM 5 MG: 50 INJECTION, SOLUTION INTRAVENOUS at 12:04

## 2025-04-21 RX ADMIN — ROCURONIUM 20 MG: 50 INJECTION, SOLUTION INTRAVENOUS at 11:24

## 2025-04-21 RX ADMIN — HYDRALAZINE HYDROCHLORIDE 4 MG: 20 INJECTION INTRAMUSCULAR; INTRAVENOUS at 13:19

## 2025-04-21 RX ADMIN — HYDROMORPHONE HYDROCHLORIDE 0.2 MG: 0.2 INJECTION, SOLUTION INTRAMUSCULAR; INTRAVENOUS; SUBCUTANEOUS at 14:21

## 2025-04-21 RX ADMIN — ONDANSETRON 4 MG: 2 INJECTION INTRAMUSCULAR; INTRAVENOUS at 20:52

## 2025-04-21 RX ADMIN — PROPOFOL 50 MG: 10 INJECTION, EMULSION INTRAVENOUS at 12:41

## 2025-04-21 RX ADMIN — SCOPOLAMINE 1 PATCH: 1.5 PATCH, EXTENDED RELEASE TRANSDERMAL at 21:16

## 2025-04-21 RX ADMIN — HYDROMORPHONE HYDROCHLORIDE 0.5 MG: 1 INJECTION, SOLUTION INTRAMUSCULAR; INTRAVENOUS; SUBCUTANEOUS at 10:51

## 2025-04-21 RX ADMIN — LEVETIRACETAM 500 MG: 500 TABLET, FILM COATED ORAL at 14:21

## 2025-04-21 RX ADMIN — FENTANYL CITRATE 50 MCG: 50 INJECTION, SOLUTION INTRAMUSCULAR; INTRAVENOUS at 09:47

## 2025-04-21 RX ADMIN — ONDANSETRON 4 MG: 2 INJECTION INTRAMUSCULAR; INTRAVENOUS at 12:04

## 2025-04-21 RX ADMIN — METOCLOPRAMIDE 10 MG: 5 INJECTION, SOLUTION INTRAMUSCULAR; INTRAVENOUS at 16:55

## 2025-04-21 RX ADMIN — ESMOLOL HYDROCHLORIDE 30 MG: 10 INJECTION, SOLUTION INTRAVENOUS at 11:00

## 2025-04-21 RX ADMIN — LIDOCAINE HYDROCHLORIDE 100 MG: 20 INJECTION, SOLUTION INTRAVENOUS at 09:48

## 2025-04-21 RX ADMIN — PROPOFOL 100 MCG/KG/MIN: 10 INJECTION, EMULSION INTRAVENOUS at 10:06

## 2025-04-21 RX ADMIN — METOCLOPRAMIDE 10 MG: 5 INJECTION, SOLUTION INTRAMUSCULAR; INTRAVENOUS at 22:44

## 2025-04-21 RX ADMIN — SUGAMMADEX 200 MG: 100 INJECTION, SOLUTION INTRAVENOUS at 12:47

## 2025-04-21 RX ADMIN — ESMOLOL HYDROCHLORIDE 30 MG: 10 INJECTION, SOLUTION INTRAVENOUS at 10:11

## 2025-04-21 RX ADMIN — HYDROMORPHONE HYDROCHLORIDE 0.5 MG: 1 INJECTION, SOLUTION INTRAMUSCULAR; INTRAVENOUS; SUBCUTANEOUS at 13:12

## 2025-04-21 RX ADMIN — PANTOPRAZOLE SODIUM 40 MG: 40 INJECTION, POWDER, FOR SOLUTION INTRAVENOUS at 16:55

## 2025-04-21 RX ADMIN — LABETALOL HYDROCHLORIDE 10 MG: 5 INJECTION INTRAVENOUS at 11:25

## 2025-04-21 RX ADMIN — FENTANYL CITRATE 50 MCG: 50 INJECTION, SOLUTION INTRAMUSCULAR; INTRAVENOUS at 10:06

## 2025-04-21 RX ADMIN — PROPOFOL 150 MCG/KG/MIN: 10 INJECTION, EMULSION INTRAVENOUS at 09:48

## 2025-04-21 RX ADMIN — HYDROMORPHONE HYDROCHLORIDE 0.5 MG: 1 INJECTION, SOLUTION INTRAMUSCULAR; INTRAVENOUS; SUBCUTANEOUS at 13:22

## 2025-04-21 RX ADMIN — LEVETIRACETAM 1000 MG: 5 INJECTION INTRAVENOUS at 10:09

## 2025-04-21 RX ADMIN — HYDROMORPHONE HYDROCHLORIDE 0.5 MG: 1 INJECTION, SOLUTION INTRAMUSCULAR; INTRAVENOUS; SUBCUTANEOUS at 11:11

## 2025-04-21 RX ADMIN — PROCHLORPERAZINE EDISYLATE 5 MG: 5 INJECTION INTRAMUSCULAR; INTRAVENOUS at 21:16

## 2025-04-21 RX ADMIN — PROPOFOL 50 MG: 10 INJECTION, EMULSION INTRAVENOUS at 10:11

## 2025-04-21 RX ADMIN — MANNITOL 50 G: 20 INJECTION, SOLUTION INTRAVENOUS at 10:12

## 2025-04-21 RX ADMIN — OXYCODONE 5 MG: 5 TABLET ORAL at 14:53

## 2025-04-21 RX ADMIN — CEFAZOLIN 2 G: 1 INJECTION, POWDER, FOR SOLUTION INTRAMUSCULAR; INTRAVENOUS at 10:34

## 2025-04-21 RX ADMIN — SODIUM CHLORIDE: 0.9 INJECTION, SOLUTION INTRAVENOUS at 10:00

## 2025-04-21 RX ADMIN — Medication 80 MCG: at 12:33

## 2025-04-21 RX ADMIN — LABETALOL HYDROCHLORIDE 10 MG: 5 INJECTION INTRAVENOUS at 11:15

## 2025-04-21 RX ADMIN — HYDRALAZINE HYDROCHLORIDE 4 MG: 20 INJECTION INTRAMUSCULAR; INTRAVENOUS at 13:12

## 2025-04-21 RX ADMIN — LEVETIRACETAM 500 MG: 500 TABLET, FILM COATED ORAL at 21:16

## 2025-04-21 RX ADMIN — Medication 1 L/MIN: at 14:43

## 2025-04-21 RX ADMIN — SODIUM CHLORIDE, SODIUM GLUCONATE, SODIUM ACETATE, POTASSIUM CHLORIDE AND MAGNESIUM CHLORIDE: 526; 502; 368; 37; 30 INJECTION, SOLUTION INTRAVENOUS at 09:40

## 2025-04-21 RX ADMIN — DEXAMETHASONE SODIUM PHOSPHATE 10 MG: 4 INJECTION INTRA-ARTICULAR; INTRALESIONAL; INTRAMUSCULAR; INTRAVENOUS; SOFT TISSUE at 10:06

## 2025-04-21 ASSESSMENT — PAIN SCALES - GENERAL
PAINLEVEL_OUTOF10: 0 - NO PAIN
PAINLEVEL_OUTOF10: 0 - NO PAIN
PAINLEVEL_OUTOF10: 7
PAIN_LEVEL: 0
PAINLEVEL_OUTOF10: 0 - NO PAIN
PAINLEVEL_OUTOF10: 0 - NO PAIN
PAINLEVEL_OUTOF10: 2
PAINLEVEL_OUTOF10: 0 - NO PAIN
PAINLEVEL_OUTOF10: 0 - NO PAIN
PAINLEVEL_OUTOF10: 6
PAINLEVEL_OUTOF10: 4

## 2025-04-21 ASSESSMENT — PAIN - FUNCTIONAL ASSESSMENT
PAIN_FUNCTIONAL_ASSESSMENT: 0-10

## 2025-04-21 ASSESSMENT — PAIN DESCRIPTION - LOCATION
LOCATION: HEAD
LOCATION: HEAD

## 2025-04-21 ASSESSMENT — COLUMBIA-SUICIDE SEVERITY RATING SCALE - C-SSRS
1. IN THE PAST MONTH, HAVE YOU WISHED YOU WERE DEAD OR WISHED YOU COULD GO TO SLEEP AND NOT WAKE UP?: NO
2. HAVE YOU ACTUALLY HAD ANY THOUGHTS OF KILLING YOURSELF?: NO
6. HAVE YOU EVER DONE ANYTHING, STARTED TO DO ANYTHING, OR PREPARED TO DO ANYTHING TO END YOUR LIFE?: NO

## 2025-04-21 ASSESSMENT — PAIN DESCRIPTION - ORIENTATION
ORIENTATION: RIGHT
ORIENTATION: RIGHT

## 2025-04-21 ASSESSMENT — PAIN SCALES - WONG BAKER: WONGBAKER_NUMERICALRESPONSE: HURTS EVEN MORE

## 2025-04-21 NOTE — ANESTHESIA PROCEDURE NOTES
Airway  Date/Time: 4/21/2025 9:50 AM  Reason: elective    Airway not difficult    Staffing  Performed: NIKHIL   Authorized by: Chalino Rojo MD    Performed by: Ayah Mcdonald  Patient location during procedure: OR    Patient Condition  Indications for airway management: anesthesia and airway protection  Patient position: sniffing  Planned trial extubation  Sedation level: deep     Final Airway Details   Preoxygenated: yes  Final airway type: endotracheal airway  Successful airway: ETT  Cuffed: yes   Successful intubation technique: direct laryngoscopy  Adjuncts used in placement: intubating stylet  Endotracheal tube insertion site: oral  Blade: Shayna  Blade size: #4  ETT size (mm): 7.5  Cormack-Lehane Classification: grade IIa - partial view of glottis  Placement verified by: chest auscultation and capnometry   Measured from: teeth  ETT to teeth (cm): 22  Number of attempts at approach: 1

## 2025-04-21 NOTE — OP NOTE
Right craniotomy for tumor resection (R) Operative Note     Date: 2025  OR Location: [facility] OR    Name: Erica Wayne, : 1975, Age: 49 y.o., MRN: [id number], Sex: female    Diagnosis  Pre-op Diagnosis      * Meningioma (Multi) [D32.9] Post-op Diagnosis     * Meningioma (Multi) [D32.9]     Procedures  Right craniotomy for tumor resection  99714 - CA CRNEC EXC RIN INFRATENTOR/POST FOSSA MENINGIOMA    CA MICROSURG TQS REQ USE OPERATING MICROSCOPE [69226]  CA STRTCTC CPTR ASSTD PX CRANIAL INTRADURAL [47130]  Surgeons      * Selwyn Lewis - Primary    Resident/Fellow/Other Assistant:  Surgeons and Role:     * Trenton Dash MD - Fellow    Staff:   Anastaciaulator: Aaron  Scrub Person: Ana Lagunasub Person: Abdelrahman    Anesthesia Staff: Anesthesiologist: Chalino Rojo MD  CRNA: CHARLES Mitchell-CRNA  SRNA: Ayah Mcdonald  Frontline Breaker: CHARLES Gray-CHERRY    Procedure Summary  Anesthesia: General  ASA: III  Estimated Blood Loss: mL  Intra-op Medications:   Administrations occurring from 1020 to 1705 on 25:   Medication Name Total Dose   polymyxin B injection 500,000 Units   sodium chloride 0.9 % irrigation solution 1,000 mL   lidocaine-epinephrine (Xylocaine W/EPI) injection 10 mL   ceFAZolin (Ancef) vial 1 g 2 g   esmolol (Brevibloc) injection 30 mg   HYDROmorphone (Dilaudid) injection 1 mg/mL 1 mg   labetalol (Normodyne,Trandate) injection 20 mg   ondansetron (Zofran) 2 mg/mL injection 4 mg   phenylephrine (Ridge-Synephrine) 10 mg/250 mL NS (40 mcg/mL) infusion 0.46 mg   propofol (Diprivan) injection 10 mg/mL 3,001.5 mg   rocuronium (ZeMuron) 50 mg/5 mL injection 25 mg              Anesthesia Record               Intraprocedure I/O Totals          Intake    mannitol 20 % 250.00 mL    Phenylephrine Drip 0.00 mL    The total shown is the total volume documented since Anesthesia Start was filed.    levETIRAcetam 500 mg/100 mL 200.00 mL    Total Intake 450 mL       Output    Urine 490  mL    Total Output 490 mL       Net    Net Volume -40 mL          Specimen:   ID Type Source Tests Collected by Time   1 : RIGHT BRAIN TUMOR Tissue BRAIN RESECTION SURGICAL PATHOLOGY EXAM Selwyn Lewis MD 4/21/2025 1151                 Drains and/or Catheters:   Urethral Catheter Non-latex 16 Fr. (Active)       Tourniquet Times:         Implants:  Implants       Type Name Action Serial No.      Graft GRAFT MATRIX, DURAL, DURAGEN PLUS 3X3 - [id number] Implanted               Findings:     Indications: Erica Wayne is an 49 y.o. female who is having surgery for Meningioma (Multi) [D32.9].     The patient was seen in the preoperative area. The risks, benefits, complications, treatment options, non-operative alternatives, expected recovery and outcomes were discussed with the patient. The possibilities of reaction to medication, pulmonary aspiration, injury to surrounding structures, bleeding, recurrent infection, the need for additional procedures, failure to diagnose a condition, and creating a complication requiring transfusion or operation were discussed with the patient. The patient concurred with the proposed plan, giving informed consent.  The site of surgery was properly noted/marked if necessary per policy. The patient has been actively warmed in preoperative area. Preoperative antibiotics  Venous thrombosis prophylaxis     Procedure Details: She was placed supine head turned for resection of right sided neoplasm after craniotomy was performed dural neuronavigation was utilized to identify the dural based lesion consistent with a meningioma confirmed with frozen section in the supratentorial space this was completely removed as well as dural attachments the dural defect was then covered with synthetic dural graft and the bone was replaced using titanium mini plates and screws the scalp was closed using sutures there were no complications  Complications:      Disposition:   Condition:                   Additional Details:     Attending Attestation:     Selwyn Lewis  Phone Number: [phone]

## 2025-04-21 NOTE — ANESTHESIA POSTPROCEDURE EVALUATION
Patient: Erica Wayne    Procedure Summary       Date: 04/21/25 Room / Location: [facility] OR 26 / [facility] OR; [facility]    Anesthesia Start: 0935 Anesthesia Stop: 1325    Procedures:       IOM      Right craniotomy for tumor resection (Right) Diagnosis:       Meningioma (Multi)      (Meningioma (Multi) [D32.9])    Scheduled Providers: Selwyn Lewis MD Responsible Provider: Chalino Rojo MD    Anesthesia Type: general ASA Status: 3            Anesthesia Type: general    Vitals Value Taken Time   /72 04/21/25 13:25   Temp 36.2   04/21/25 13:25   Pulse 70 04/21/25 13:25   Resp 18 04/21/25 13:25   SpO2 98 % 04/21/25 13:25   Vitals shown include unfiled device data.    Anesthesia Post Evaluation    Patient location during evaluation: bedside  Patient participation: complete - patient participated  Level of consciousness: awake and alert  Pain score: 0  Pain management: adequate  Airway patency: patent  Cardiovascular status: acceptable  Respiratory status: acceptable  Hydration status: acceptable  Postoperative Nausea and Vomiting: none        There were no known notable events for this encounter.

## 2025-04-21 NOTE — PROGRESS NOTES
[facility]  NEUROSCIENCE INTENSIVE CARE UNIT  DAILY PROGRESS NOTE       Patient Name: Erica Wayne   MRN: [id number]     Admit Date: 2025     : 1975 AGE: 49 y.o. GENDER: female        Subjective    Erica Wayne is a 49 y.o. female, previously healthy. Admitted 2025 with Meningioma (Multi) after presenting with sinus headache and found to have two right hemisphere meningiomas . MRI on 23 demonstrated an extra-axial R parietal convexity lesion measuring 2.4 x 2.8 x 2.7cm with dural tail. A second lesion was identified at the right parasagittal aspect of right parieto-occipital convexity measuring 2.6 x 1.6 x 2.6cm. Admitted to NSU s/p resection .       Objective   VITALS (24H):  Temp:  [36.2 °C (97.2 °F)-36.7 °C (98.1 °F)] 36.7 °C (98.1 °F)  Heart Rate:  [66-86] 74  Resp:  [14-20] 15  BP: (161)/(75) 161/75  Arterial Line BP 1: (132-163)/(68-78) 149/71  INTAKE/OUTPUT:  Intake/Output Summary (Last 24 hours) at 2025  Last data filed at 2025 1800  Gross per 24 hour   Intake 2143.75 ml   Output 1365 ml   Net 778.75 ml     VENT SETTINGS:        PHYSICAL EXAM:  NEURO:  - Awake, AOx3  - EOS, PERRL 3mm-->2mm bilaterally. VFF  - HEADLEY 5/5 strength  - SILT   - R crani site c/d/i  CV:  - RRR on telemetry, NSR  - Arterial line in place  RESP:  - No signs of resp distress  - On RA  :  - Mcgarry draining clear yellow urine to gravity   GI:  - Abdomen NT/ND, soft  SKIN:  - Intact    MEDICATIONS:  Scheduled: PRN: Continuous:   Scheduled Medications[1] PRN Medications[2] Continuous Medications[3]     LAB RESULTS:                     IMAGING RESULTS:  No results found.           Assessment/Plan    49 y.o. female, previously healthy.  Admitted 2025 with Meningioma (Multi) after presenting with sinus headache and found to have two right hemisphere meningiomas . MRI on 23 demonstrated an extra-axial R parietal convexity lesion measuring 2.4 x 2.8 x 2.7cm with dural tail. A  second lesion was identified at the right parasagittal aspect of right parieto-occipital convexity measuring 2.6 x 1.6 x 2.6cm. Admitted s/p resection .    NEURO:  #R parietal convexity lesion s/p meningioma resection  Assessment:  - MRI on 23 demonstrated an extra-axial R parietal convexity lesion measuring 2.4 x 2.8 x 2.7cm with dural tail. A second lesion was identified at the right parasagittal aspect of right parieto-occipital convexity measuring 2.6 x 1.6 x 2.6cm  Plan:  - NSU  - NSGY primary  - Neuro Checks: Q1H  - Keppra 500mg BID  - Pain: tylenol PRN , oxycodone PRN, dilaudid for breakthrough   - Nausea: PRN  - Post-op CTH   - surgical pathology pending  - PT/OT/SLP    CARDIOVASCULAR:  Assessment:  - SR on tele   Plan:  - Continue to monitor on telemetry  - BP goal: SBP < 160    -->PRNS if needed     RESPIRATORY:  Assessment:  - On RA  Plan:  - Continuous pulse oximetry   - O2 PRN to maintain SpO2 > 94%, wean as tolerated  - Incentive spirometry while awake    RENAL/:  Assessment:  - baseline creatinine .66        Plan:  - Monitor with daily RFP  -  PRN    FEN/GI:  #GERD  Assessment:  - Last BM: PTA  Plan:  - Monitor and replace electrolytes per protocol  - IVF: PRN  - Diet: regular    - Bowel Regimen: Docusate-Senna PRN and Miralax PRN    ENDOCRINE:  Assessment:  - BG: <150  Plan:  - Accuchecks & ISS PRN   - Hypoglycemia protocol    HEMATOLOGY:  Assessment:  - Baseline Hgb: 12.7  - Baseline Plts: 329      Plan:  - Continue to monitor with daily CBC and Coag panel    INFECTIOUS DISEASE:  Assessment:     - Temp (24hrs), Av.4 °C (97.6 °F), Min:36.2 °C (97.2 °F), Max:36.7 °C (98.1 °F)     Plan:  - Continue to monitor for s/sx of infection  - Pan culture for temperature > 38.4 C    MUSCULOSKELETAL:  - No acute issues    SKIN:  - No acute issues  - Turns and skin care per NSU protocol    ACCESS:  - PIV  - arterial line    PROPHYLAXIS:  - DVT Ppx: Hold SQH until POD #2  - GI Ppx:  PRN    RESTRAINTS:  Not indicated/Patient does not meet criteria for restraints    CHARLES Mcleod-CNP  Neuroscience Intensive Care       Total additional critical care time of 60 minutes, with > 50% of time spent in direct contact with patient/family for education, counseling and coordination of care.               [1] cholecalciferol, 50 mcg, oral, Daily  pantoprazole, 40 mg, oral, Daily before breakfast   Or  esomeprazole, 40 mg, nasoduodenal tube, Daily before breakfast   Or  pantoprazole, 40 mg, intravenous, Daily before breakfast  [START ON 4/23/2025] heparin (porcine), 5,000 Units, subcutaneous, q8h  levETIRAcetam, 500 mg, oral, BID  oxygen, , inhalation, Continuous - 02/gases  polyethylene glycol, 17 g, oral, Daily     [2] PRN medications: calcium carbonate, cetirizine, hydrALAZINE, HYDROmorphone, labetaloL, metoclopramide **OR** metoclopramide, naloxone, ondansetron **OR** ondansetron, oxyCODONE, oxyCODONE, oxyCODONE  [3] sodium chloride 0.9%, 75 mL/hr, Last Rate: 75 mL/hr (04/21/25 1800)

## 2025-04-21 NOTE — ANESTHESIA PREPROCEDURE EVALUATION
Patient: Erica Wayne    Procedure Information       Date/Time: 04/21/25 1020    Procedure: Right craniotomy for tumor resection (Right)    Location: [facility] OR 26 / [facility] OR    Surgeons: Selwyn Lewis MD            Relevant Problems   Neuro   (+) Chronic headaches      Nervous   (+) Meningioma (Multi)       Clinical information reviewed:   Tobacco  Allergies  Meds   Med Hx  Surg Hx  OB Status  Fam Hx  Soc   Hx        NPO Detail:  NPO/Void Status  Date of Last Liquid: 04/21/25  Time of Last Liquid: 0500  Date of Last Solid: 04/20/25  Time of Last Solid: 1930  Last Intake Type: Clear fluids         Physical Exam    Airway  Mallampati: III  TM distance: >3 FB  Neck ROM: full  Mouth opening: 3 or more finger widths     Cardiovascular - normal exam   Dental - normal exam     Pulmonary - normal exam   Abdominal - normal exam           Anesthesia Plan    History of general anesthesia?: yes  History of complications of general anesthesia?: no    ASA 3     general     intravenous induction   Postoperative pain plan includes opioids.  Trial extubation is planned.  Anesthetic plan and risks discussed with patient.  Use of blood products discussed with patient who consented to blood products.    Plan discussed with CRNA.

## 2025-04-21 NOTE — ANESTHESIA PROCEDURE NOTES
Arterial Line:    Date/Time: 4/21/2025 9:58 AM    Staffing  Performed: CRNA   Authorized by: Chalino Rojo MD    Performed by: CHARLES Mitchell-CRNA    An arterial line was placed. Procedure performed using surface landmarks.in the ICU for the following indication(s): continuous blood pressure monitoring and blood sampling needed.    A 20 gauge (size), 5 cm (length), Angiocath (type) catheter was placed into the Left radial artery, secured by Tegaderm,   Seldinger technique used.

## 2025-04-22 LAB
ALBUMIN SERPL BCP-MCNC: 3.8 G/DL (ref 3.4–5)
ALBUMIN SERPL BCP-MCNC: 3.9 G/DL (ref 3.4–5)
ALBUMIN SERPL BCP-MCNC: 4.1 G/DL (ref 3.4–5)
ANION GAP SERPL CALC-SCNC: 13 MMOL/L (ref 10–20)
ANION GAP SERPL CALC-SCNC: 14 MMOL/L (ref 10–20)
ANION GAP SERPL CALC-SCNC: 14 MMOL/L (ref 10–20)
BUN SERPL-MCNC: 7 MG/DL (ref 6–23)
BUN SERPL-MCNC: 8 MG/DL (ref 6–23)
BUN SERPL-MCNC: 8 MG/DL (ref 6–23)
CA-I BLD-SCNC: 1.15 MMOL/L (ref 1.1–1.33)
CALCIUM SERPL-MCNC: 8.5 MG/DL (ref 8.6–10.6)
CALCIUM SERPL-MCNC: 8.6 MG/DL (ref 8.6–10.6)
CALCIUM SERPL-MCNC: 9 MG/DL (ref 8.6–10.6)
CHLORIDE SERPL-SCNC: 101 MMOL/L (ref 98–107)
CHLORIDE SERPL-SCNC: 104 MMOL/L (ref 98–107)
CHLORIDE SERPL-SCNC: 107 MMOL/L (ref 98–107)
CO2 SERPL-SCNC: 21 MMOL/L (ref 21–32)
CO2 SERPL-SCNC: 22 MMOL/L (ref 21–32)
CO2 SERPL-SCNC: 24 MMOL/L (ref 21–32)
CREAT SERPL-MCNC: 0.48 MG/DL (ref 0.5–1.05)
CREAT SERPL-MCNC: 0.54 MG/DL (ref 0.5–1.05)
CREAT SERPL-MCNC: 0.55 MG/DL (ref 0.5–1.05)
EGFRCR SERPLBLD CKD-EPI 2021: >90 ML/MIN/1.73M*2
ERYTHROCYTE [DISTWIDTH] IN BLOOD BY AUTOMATED COUNT: 13.1 % (ref 11.5–14.5)
GLUCOSE SERPL-MCNC: 107 MG/DL (ref 74–99)
GLUCOSE SERPL-MCNC: 117 MG/DL (ref 74–99)
GLUCOSE SERPL-MCNC: 134 MG/DL (ref 74–99)
HCT VFR BLD AUTO: 37.9 % (ref 36–46)
HGB BLD-MCNC: 12.3 G/DL (ref 12–16)
MAGNESIUM SERPL-MCNC: 1.81 MG/DL (ref 1.6–2.4)
MCH RBC QN AUTO: 29.6 PG (ref 26–34)
MCHC RBC AUTO-ENTMCNC: 32.5 G/DL (ref 32–36)
MCV RBC AUTO: 91 FL (ref 80–100)
NRBC BLD-RTO: 0 /100 WBCS (ref 0–0)
PHOSPHATE SERPL-MCNC: 2.8 MG/DL (ref 2.5–4.9)
PHOSPHATE SERPL-MCNC: 3.4 MG/DL (ref 2.5–4.9)
PHOSPHATE SERPL-MCNC: 3.6 MG/DL (ref 2.5–4.9)
PLATELET # BLD AUTO: 317 X10*3/UL (ref 150–450)
POTASSIUM SERPL-SCNC: 3.8 MMOL/L (ref 3.5–5.3)
POTASSIUM SERPL-SCNC: 3.9 MMOL/L (ref 3.5–5.3)
POTASSIUM SERPL-SCNC: 3.9 MMOL/L (ref 3.5–5.3)
RBC # BLD AUTO: 4.15 X10*6/UL (ref 4–5.2)
SODIUM SERPL-SCNC: 133 MMOL/L (ref 136–145)
SODIUM SERPL-SCNC: 135 MMOL/L (ref 136–145)
SODIUM SERPL-SCNC: 140 MMOL/L (ref 136–145)
WBC # BLD AUTO: 14.8 X10*3/UL (ref 4.4–11.3)

## 2025-04-22 PROCEDURE — 99024 POSTOP FOLLOW-UP VISIT: CPT | Performed by: NEUROLOGICAL SURGERY

## 2025-04-22 PROCEDURE — 80069 RENAL FUNCTION PANEL: CPT | Performed by: PHYSICIAN ASSISTANT

## 2025-04-22 PROCEDURE — 82330 ASSAY OF CALCIUM: CPT | Performed by: REGISTERED NURSE

## 2025-04-22 PROCEDURE — 2500000004 HC RX 250 GENERAL PHARMACY W/ HCPCS (ALT 636 FOR OP/ED): Mod: JZ | Performed by: STUDENT IN AN ORGANIZED HEALTH CARE EDUCATION/TRAINING PROGRAM

## 2025-04-22 PROCEDURE — 97165 OT EVAL LOW COMPLEX 30 MIN: CPT | Mod: GO

## 2025-04-22 PROCEDURE — 80069 RENAL FUNCTION PANEL: CPT | Performed by: NEUROLOGICAL SURGERY

## 2025-04-22 PROCEDURE — 2500000004 HC RX 250 GENERAL PHARMACY W/ HCPCS (ALT 636 FOR OP/ED): Mod: JZ | Performed by: NEUROLOGICAL SURGERY

## 2025-04-22 PROCEDURE — 83735 ASSAY OF MAGNESIUM: CPT | Performed by: REGISTERED NURSE

## 2025-04-22 PROCEDURE — 97161 PT EVAL LOW COMPLEX 20 MIN: CPT | Mod: GP

## 2025-04-22 PROCEDURE — 1100000001 HC PRIVATE ROOM DAILY

## 2025-04-22 PROCEDURE — 2500000001 HC RX 250 WO HCPCS SELF ADMINISTERED DRUGS (ALT 637 FOR MEDICARE OP): Performed by: NEUROLOGICAL SURGERY

## 2025-04-22 PROCEDURE — 84100 ASSAY OF PHOSPHORUS: CPT | Performed by: REGISTERED NURSE

## 2025-04-22 PROCEDURE — 85027 COMPLETE CBC AUTOMATED: CPT | Performed by: NEUROLOGICAL SURGERY

## 2025-04-22 PROCEDURE — 37799 UNLISTED PX VASCULAR SURGERY: CPT | Performed by: REGISTERED NURSE

## 2025-04-22 PROCEDURE — 2500000004 HC RX 250 GENERAL PHARMACY W/ HCPCS (ALT 636 FOR OP/ED): Mod: JZ | Performed by: REGISTERED NURSE

## 2025-04-22 RX ORDER — LEVETIRACETAM 500 MG/1
500 TABLET ORAL 2 TIMES DAILY
Qty: 11 TABLET | Refills: 0 | Status: SHIPPED | OUTPATIENT
Start: 2025-04-22 | End: 2025-04-28

## 2025-04-22 RX ORDER — POLYETHYLENE GLYCOL 3350 17 G/17G
17 POWDER, FOR SOLUTION ORAL DAILY
Qty: 14 EACH | Refills: 0 | Status: SHIPPED | OUTPATIENT
Start: 2025-04-23

## 2025-04-22 RX ORDER — OXYCODONE HYDROCHLORIDE 5 MG/1
5 TABLET ORAL EVERY 6 HOURS PRN
Qty: 28 TABLET | Refills: 0 | Status: SHIPPED | OUTPATIENT
Start: 2025-04-22

## 2025-04-22 RX ADMIN — PANTOPRAZOLE SODIUM 40 MG: 40 INJECTION, POWDER, FOR SOLUTION INTRAVENOUS at 08:09

## 2025-04-22 RX ADMIN — OXYCODONE 5 MG: 5 TABLET ORAL at 00:30

## 2025-04-22 RX ADMIN — LEVETIRACETAM 500 MG: 500 TABLET, FILM COATED ORAL at 20:35

## 2025-04-22 RX ADMIN — ONDANSETRON 4 MG: 2 INJECTION INTRAMUSCULAR; INTRAVENOUS at 08:09

## 2025-04-22 RX ADMIN — LABETALOL HYDROCHLORIDE 10 MG: 5 INJECTION, SOLUTION INTRAVENOUS at 02:48

## 2025-04-22 RX ADMIN — SODIUM CHLORIDE 1000 ML: 0.9 INJECTION, SOLUTION INTRAVENOUS at 02:25

## 2025-04-22 RX ADMIN — LEVETIRACETAM 500 MG: 500 TABLET, FILM COATED ORAL at 08:09

## 2025-04-22 RX ADMIN — POLYETHYLENE GLYCOL 3350 17 G: 17 POWDER, FOR SOLUTION ORAL at 21:00

## 2025-04-22 ASSESSMENT — COGNITIVE AND FUNCTIONAL STATUS - GENERAL
DAILY ACTIVITIY SCORE: 22
HELP NEEDED FOR BATHING: A LITTLE
MOVING FROM LYING ON BACK TO SITTING ON SIDE OF FLAT BED WITH BEDRAILS: A LITTLE
TOILETING: A LITTLE
WALKING IN HOSPITAL ROOM: A LITTLE
TURNING FROM BACK TO SIDE WHILE IN FLAT BAD: A LITTLE
CLIMB 3 TO 5 STEPS WITH RAILING: A LITTLE
MOBILITY SCORE: 18
MOVING TO AND FROM BED TO CHAIR: A LITTLE
STANDING UP FROM CHAIR USING ARMS: A LITTLE

## 2025-04-22 ASSESSMENT — ACTIVITIES OF DAILY LIVING (ADL)
ADL_ASSISTANCE: INDEPENDENT
ADL_ASSISTANCE: INDEPENDENT
BATHING_ASSISTANCE: STAND BY
ADLS_ADDRESSED: NO

## 2025-04-22 ASSESSMENT — PAIN - FUNCTIONAL ASSESSMENT
PAIN_FUNCTIONAL_ASSESSMENT: 0-10

## 2025-04-22 ASSESSMENT — PAIN SCALES - GENERAL
PAINLEVEL_OUTOF10: 0 - NO PAIN
PAINLEVEL_OUTOF10: 3
PAINLEVEL_OUTOF10: 0 - NO PAIN
PAINLEVEL_OUTOF10: 2
PAINLEVEL_OUTOF10: 0 - NO PAIN
PAINLEVEL_OUTOF10: 0 - NO PAIN
PAINLEVEL_OUTOF10: 5 - MODERATE PAIN
PAINLEVEL_OUTOF10: 0 - NO PAIN
PAINLEVEL_OUTOF10: 0 - NO PAIN

## 2025-04-22 NOTE — CARE PLAN
Problem: Pain  Goal: Takes deep breaths with improved pain control throughout the shift  Outcome: Progressing  Goal: Turns in bed with improved pain control throughout the shift  Outcome: Progressing  Goal: Walks with improved pain control throughout the shift  Outcome: Progressing  Goal: Performs ADL's with improved pain control throughout shift  Outcome: Progressing  Goal: Participates in PT with improved pain control throughout the shift  Outcome: Progressing  Goal: Free from opioid side effects throughout the shift  Outcome: Progressing  Goal: Free from acute confusion related to pain meds throughout the shift  Outcome: Progressing     Problem: Skin  Goal: Decreased wound size/increased tissue granulation at next dressing change  Outcome: Progressing  Goal: Participates in plan/prevention/treatment measures  Outcome: Progressing  Goal: Prevent/manage excess moisture  Outcome: Progressing  Goal: Prevent/minimize sheer/friction injuries  Outcome: Progressing  Goal: Promote/optimize nutrition  Outcome: Progressing  Goal: Promote skin healing  Outcome: Progressing     Problem: Fall/Injury  Goal: Not fall by end of shift  Outcome: Progressing  Goal: Be free from injury by end of the shift  Outcome: Progressing  Goal: Verbalize understanding of personal risk factors for fall in the hospital  Outcome: Progressing  Goal: Verbalize understanding of risk factor reduction measures to prevent injury from fall in the home  Outcome: Progressing  Goal: Use assistive devices by end of the shift  Outcome: Progressing  Goal: Pace activities to prevent fatigue by end of the shift  Outcome: Progressing

## 2025-04-22 NOTE — PROGRESS NOTES
Physical Therapy    Physical Therapy Evaluation    Patient Name: Erica Wayne  MRN: [id number]  Today's Date: 4/22/2025   Room: 13/13A  Time Calculation  Start Time: 1108  Stop Time: 1129  Time Calculation (min): 21 min    Assessment/Plan   PT Assessment  PT Assessment Results: Impaired balance, Decreased endurance, Decreased mobility  Rehab Prognosis: Excellent  Barriers to Discharge Home: No anticipated barriers  Evaluation/Treatment Tolerance: Patient tolerated treatment well  Strengths: Attitude of self, Premorbid level of function  End of Session Communication: Bedside nurse  Assessment Comment: Pt to benefit from ongoing PT services to address the above limitations and to facilitate timely return to prior level of function  End of Session Patient Position: Bed, 3 rail up, Alarm off, not on at start of session  IP OR SWING BED PT PLAN  Inpatient or Swing Bed: Inpatient  PT Plan  Treatment/Interventions: Bed mobility, Gait training, Stair training, Balance training, Neuromuscular re-education, Strengthening, Endurance training, Therapeutic exercise, Therapeutic activity, Home exercise program, Postural re-education  PT Plan: Ongoing PT  PT Frequency: 2 times per week  PT Discharge Recommendations: No PT needed after discharge  Equipment Recommended upon Discharge:  (No device needs)  PT Recommended Transfer Status: Assist x1  PT - OK to Discharge: Yes      Subjective   General Visit Information:  Reason for Referral: Pt initially p/w sinus headache and found to have two right hemisphere meningiomas . 4/21 s/p R crani for tumor resection.  Past Medical History Relevant to Rehab: GERD, cholecystectomy  Prior to Session Communication: Bedside nurse  Patient Position Received: Bed, 3 rail up, Alarm off, not on at start of session  Family/Caregiver Present: No  Caregiver Feedback: n/a  General Comment: Pt pleasant and cooperative     Home Living:  Home Living  Type of Home: House  Lives With: Spouse  Home Layout: Two  level, Bed/bath upstairs, Laundry main level  Home Access: Stairs to enter with rails  Entrance Stairs-Number of Steps: 2-3  Bathroom Shower/Tub: Walk-in shower  Bathroom Equipment: None    Prior Level of Function:  Prior Function Per Pt/Caregiver Report  Level of [place]: Independent with ADLs and functional transfers, Independent with homemaking with ambulation  Receives Help From: Family ( supportive. Sister coming to stay upon d/c and MIL plans to help as well.)  ADL Assistance: Independent  Homemaking Assistance: Independent  Ambulatory Assistance: Independent  Vocational: Full time employment (RN Endoscopy)  Prior Function Comments: (+)drives, denies falls    Precautions:  Precautions  Medical Precautions: Fall precautions  Precautions Comment: SBP <160    Vital Signs:   Date/Time Vitals Session Patient Position Pulse Resp SpO2 BP MAP (mmHg)    04/22/25 1000 --  --  63  17  98 %  --  --     04/22/25 1050 --  --  70  15  --  138/79  94     04/22/25 1100 --  --  70  17  97 %  --  --     04/22/25 1108 Pre PT  Lying  69  18  99 %  130/76  93     04/22/25 1118 During PT  Sitting  79  --  100 %  134/67  88     04/22/25 1129 Post PT  Lying  75  24  99 %  142/82  99             Objective   Lines/Tubes/Drains:  Arterial Line 04/21/25 Left Radial (Active)   Number of days: 1       Continuous Medications/Drips:  Continuous Medications[1]    Oxygen: room air     Pain:  Pain Assessment  Pain Assessment: 0-10  0-10 (Numeric) Pain Score: 0 - No pain    Cognition:  Cognition  Overall Cognitive Status: Within Functional Limits  Orientation Level: Oriented X4  Insight: Within function limits  Impulsive: Within functional limits  Processing Speed: Within funtional limits      Extremity/Trunk Assessments:  Strength:       RUE   RUE : Within Functional Limits    LUE   LUE: Within Functional Limits    RLE   RLE : Within Functional Limits    LLE   LLE : Within Functional Limits    General Assessments:         Activity  Tolerance  Endurance: Endurance does not limit participation in activity  Early Mobility/Exercise Safety Screen: Proceed with mobilization - No exclusion criteria met  Sensation  Light Touch: No apparent deficits  Coordination  Movements are Fluid and Coordinated: Yes  Static Sitting Balance  Static Sitting-Balance Support: Bilateral upper extremity supported, Feet supported  Static Sitting-Level of Assistance: Distant supervision  Dynamic Sitting Balance  Dynamic Sitting-Balance Support: Bilateral upper extremity supported, Feet supported  Dynamic Sitting-Level of Assistance: Close supervision  Static Standing Balance  Static Standing-Balance Support: No upper extremity supported  Static Standing-Level of Assistance: Close supervision  Dynamic Standing Balance  Dynamic Standing-Balance Support: No upper extremity supported  Dynamic Standing-Level of Assistance: Close supervision  Dynamic Standing-Balance: Turning    Functional Assessments:  Bed Mobility  Bed Mobility: Yes  Bed Mobility 1  Bed Mobility 1: Supine to sitting, Sitting to supine  Level of Assistance 1: Close supervision  Bed Mobility Comments 1: HOB elevated, therpist managed lines.    Transfers  Transfer: Yes  Transfer 1  Technique 1: Sit to stand, Stand to sit  Transfer Level of Assistance 1: Close supervision  Trials/Comments 1: 2x from low surfaces. Cues for self-monitoring activity tolerance    Ambulation/Gait Training  Ambulation/Gait Training Performed: Yes  Ambulation/Gait Training 1  Surface 1: Level tile  Device 1: No device  Assistance 1: Close supervision  Quality of Gait 1: Narrow base of support (1 occurrence of pt stepping past midline, requiring stepping strategy to prevent loss of balance)  Comments/Distance (ft) 1: 150 ft    Stairs  Stairs: No         Outcome Measures:  [facility] Basic Mobility  Turning from your back to your side while in a flat bed without using bedrails: A little  Moving from lying on your back to sitting on the side of  a flat bed without using bedrails: A little  Moving to and from bed to chair (including a wheelchair): A little  Standing up from a chair using your arms (e.g. wheelchair or bedside chair): A little  To walk in hospital room: A little  Climbing 3-5 steps with railing: A little  Basic Mobility - Total Score: 18                   FSS-ICU  Ambulation: Walks >/ or equal to 150 feet independently  Rolling: Supervision or set-up only  Sitting: Supervision or set-up only  Transfer Sit-to-Stand: Supervision or set-up only  Transfer Supine-to-Sit: Supervision or set-up only  Total Score: 27    ICU Mobility Screen  Early Mobility/Exercise Safety Screen: Proceed with mobilization - No exclusion criteria met  ICU Mobility Scale: Walking with assistance of 1 person                        Encounter Problems       Encounter Problems (Active)       PT Problem       Patient will perform sit to stand and stand to sit transfers MOD-I with LRD to increase functional strength.   (Progressing)       Start:  04/22/25    Expected End:  05/06/25            Patient will ascend and descend >/= 3 steps MOD-I with railing  to facilitate safe navigation of stairs in the home.     (Progressing)       Start:  04/22/25    Expected End:  05/06/25            Patient will ambulate at least 500 ft. MOD-I with LRD to improve tolerance of community distances.     (Progressing)       Start:  04/22/25    Expected End:  05/06/25            Patient will score >/= 22/30 points on the Functional Gait Assessment to indicate decreased risk of falling. (Progressing)       Start:  04/22/25    Expected End:  05/06/25                   Education Documentation  Precautions, taught by Edda Velez PT at 4/22/2025 11:52 AM.  Learner: Patient  Readiness: Acceptance  Method: Explanation  Response: Verbalizes Understanding  Comment: PT expectations, mobility precautions, d/c recs    Body Mechanics, taught by Edda Velez PT at 4/22/2025 11:52 AM.  Learner:  Patient  Readiness: Acceptance  Method: Explanation  Response: Verbalizes Understanding  Comment: PT expectations, mobility precautions, d/c recs    Mobility Training, taught by Edda Velez PT at 4/22/2025 11:52 AM.  Learner: Patient  Readiness: Acceptance  Method: Explanation  Response: Verbalizes Understanding  Comment: PT expectations, mobility precautions, d/c recs    Education Comments  No comments found.            04/22/25 at 11:52 AM   Edda Velez, PT   Rehab Office: [phone]             [1]

## 2025-04-22 NOTE — HOSPITAL COURSE
Erica Wayne is a 49 y.o. female with a past medical history of GERD and cholecystectomy. On 4/21 She was admitted to Neurosurgery for scheduled surgery.   4/21 s/p R crani for tumor resection, CTH POC. Post op sodium 133- given a fluid bolus with improvement.     PT/OT evaluated patient and recommended no further therapy needs at time of discharge.      On the day of discharge, the patient was seen and evaluated by the neurosurgery team and deemed suitable for discharge. The patient was given detailed discharge instructions and were scheduled to follow up as an outpatient.

## 2025-04-22 NOTE — PROGRESS NOTES
Erica Wayne is a 49 y.o. female on day 1 of admission presenting with Meningioma (Multi).    Subjective   naeon       Objective     Physical Exam  Ox3  FC x4, 5/5  Last Recorded Vitals  Blood pressure 161/75, pulse 66, temperature 36.1 °C (97 °F), temperature source Temporal, resp. rate 14, weight 102 kg (225 lb 12 oz), SpO2 97%.  Intake/Output last 3 Shifts:  I/O last 3 completed shifts:  In: 2143.8 [I.V.:293.8; IV Piggyback:1850]  Out: 1365 [Urine:1315; Blood:50]    Relevant Results                            Assessment & Plan  Meningioma (Multi)    Chronic headaches    h/o GERD, cholecystectomy, 4/21 s/p R crani for tumor resection, CTH POC    PLAN  NSU - plan to down grade  Fu final path  PTOT  LOVELYs          Wilton Hwang MD

## 2025-04-22 NOTE — PROGRESS NOTES
Occupational Therapy    Evaluation    Patient Name: Erica Wayne  MRN: [id number]  Today's Date: 4/22/2025  Room: 13/13  Time Calculation  Start Time: 0859  Stop Time: 0923  Time Calculation (min): 24 min    Assessment  IP OT Assessment  OT Assessment: Overall decreased strength and endurance limiting occupational performance  Prognosis: Excellent  Barriers to Discharge Home: No anticipated barriers  Evaluation/Treatment Tolerance: Patient tolerated treatment well  Medical Staff Made Aware: Yes  End of Session Patient Position: Up in chair, Alarm off, not on at start of session  Plan:  Inpatient Plan  Treatment Interventions: ADL retraining, Endurance training, Neuromuscular reeducation, Compensatory technique education  OT Frequency: 1 time per week  OT Discharge Recommendations: No OT needed after discharge  OT Recommended Transfer Status: Stand by assist  OT - OK to Discharge: Yes  OT Assessment  OT Assessment Results: Decreased ADL status, Decreased endurance, Decreased functional mobility  Prognosis: Excellent  Evaluation/Treatment Tolerance: Patient tolerated treatment well  Medical Staff Made Aware: Yes    Subjective   Current Problem:  1. Meningioma (Multi)  Surgical Pathology Exam    Surgical Pathology Exam        General:  Reason for Referral: 48 y/o female presenting with meningioma. 4/21 s/p R crani for tumor resection.  Past Medical History Relevant to Rehab: GERD, cholecystectomy  Prior to Session Communication: Bedside nurse  Patient Position Received: Bed, 3 rail up, Alarm off, not on at start of session  Family/Caregiver Present: No  General Comment: Pt is pleasant and cooperative. She puts forth good effort towards task completion.   Precautions:  Medical Precautions: Fall precautions  Precautions Comment: SBP<160  Vital Signs:   Date/Time Vitals Session Patient Position Pulse Resp SpO2 BP MAP (mmHg)    04/22/25 0859 Pre OT  Lying  85  21  --  116/76  --    04/22/25 0900 --  --  62  17  99 %  --  --     04/22/25 0920 Post OT  Sitting  77  16  --  157/87  --    04/22/25 1000 --  --  63  17  --  --  --          Pain:  Pain Assessment  Pain Assessment: 0-10  0-10 (Numeric) Pain Score: 0 - No pain  Lines/Tubes/Drains:  Arterial Line 04/21/25 Left Radial (Active)   Number of days: 1       Urethral Catheter Non-latex 16 Fr. (Active)   Number of days: 1         Objective   Cognition:  Overall Cognitive Status: Within Functional Limits  Orientation Level: Oriented X4     Confusion Assessment Method (CAM)  Acute Onset and Fluctuating Course (1A): No  Carlson Agitation Sedation Scale  Carlson Agitation Sedation Scale (RASS): Alert and calm  Home Living:  Type of Home: House  Lives With: Spouse  Home Layout: Two level, Bed/bath upstairs, Laundry main level  Home Access: Stairs to enter with rails  Entrance Stairs-Number of Steps: 2-3  Bathroom Shower/Tub: Walk-in shower  Bathroom Equipment: None   Prior Function:  Level of [place]: Independent with ADLs and functional transfers, Independent with homemaking with ambulation  Receives Help From: Family ( supportive. Sister coming to stay upon d/c and MIL plans to help as well.)  ADL Assistance: Independent  Homemaking Assistance: Independent  Ambulatory Assistance: Independent  Vocational: Full time employment (RN Endoscopy)  Prior Function Comments: (+)drives, denies falls  IADL History:     ADL:  Grooming Assistance: Stand by  Grooming Deficit: Setup  Bathing Assistance: Stand by  UE Dressing Assistance: Independent  LE Dressing Assistance: Stand by  LE Dressing Deficit: Don/doff R sock, Don/doff L sock  Toileting Assistance with Device: Stand by  Activity Tolerance:  Endurance: Endurance does not limit participation in activity  Early Mobility/Exercise Safety Screen: Proceed with mobilization - No exclusion criteria met  Balance:  Static Sitting Balance  Static Sitting-Level of Assistance: Independent  Static Standing Balance  Static Standing-Level of  Assistance: Close supervision  Bed Mobility/Transfers: Bed Mobility  Bed Mobility: Yes  Bed Mobility 1  Bed Mobility 1: Supine to sitting  Level of Assistance 1: Close supervision  Bed Mobility Comments 1: Slow to complete  Functional Mobility  Functional Mobility Performed: Yes  Functional Mobility 1  Device 1: No device  Assistance 1: Close supervision (Pushing IV pole)  Comments 1: Pt ambulated through room bed->sink->chair   and Transfers  Transfer: Yes  Transfer 1  Transfer From 1: Sit to  Transfer to 1: Stand  Technique 1: Sit to stand, Stand to sit  Transfer Level of Assistance 1: Contact guard, Hand held assistance  IADL's:      Vision:     and Vision - Complex Assessment  Ocular Range of Motion: Within Functional Limits  Sensation:  Light Touch: No apparent deficits  Strength:     Perception:  Inattention/Neglect: Appears intact  Coordination:  Movements are Fluid and Coordinated: Yes   Hand Function:  Hand Function  Gross Grasp: Functional  Coordination: Functional  Extremities: RUE   RUE : Within Functional Limits, LUE   LUE: Within Functional Limits, RLE   RLE : Within Functional Limits, and LLE   LLE : Within Functional Limits    Outcome Measures: [facility] Daily Activity  Putting on and taking off regular lower body clothing: None  Bathing (including washing, rinsing, drying): A little  Putting on and taking off regular upper body clothing: None  Toileting, which includes using toilet, bedpan or urinal: A little  Taking care of personal grooming such as brushing teeth: None  Eating Meals: None  Daily Activity - Total Score: 22    Confusion Assessment Method-ICU (CAM-ICU)  Feature 1: Acute Onset or Fluctuating Course: Negative  Feature 3: Altered Level of Consciousness: Negative  Overall CAM-ICU: Negative   ICU Mobility Screen  Early Mobility/Exercise Safety Screen: Proceed with mobilization - No exclusion criteria met,   Carlson Agitation Sedation Scale  Carlson Agitation Sedation Scale (RASS): Alert and  calm      Education Documentation  Body Mechanics, taught by Christy Tineo OT at 4/22/2025 10:31 AM.  Learner: Patient  Readiness: Acceptance  Method: Explanation  Response: Verbalizes Understanding    Precautions, taught by Christy Tineo OT at 4/22/2025 10:31 AM.  Learner: Patient  Readiness: Acceptance  Method: Explanation  Response: Verbalizes Understanding    ADL Training, taught by Christy Tineo OT at 4/22/2025 10:31 AM.  Learner: Patient  Readiness: Acceptance  Method: Explanation  Response: Verbalizes Understanding    Education Comments  No comments found.        Goals:     Encounter Problems       Encounter Problems (Active)       ADLs       Patient will complete lower body dressing with MOD I  for donning and doffing all LE clothes in order to increase Indep with task participation.        Start:  04/22/25    Expected End:  05/06/25            Patient will complete toileting, including clothing management and hygiene, with MOD I in order to maximize functional Indep with task completion.        Start:  04/22/25    Expected End:  05/06/25               COGNITION/SAFETY       Pt will identify and incorporate 3 EC/WS strategies into daily routines for increased safety and Indep.        Start:  04/22/25    Expected End:  05/06/25               MOBILITY       Pt will demo increased functional mobility to tolerate tasks necessary to complete ADL routine with MOD I.       Start:  04/22/25    Expected End:  05/06/25               TRANSFERS       Patient will complete functional transfers using least restrictive device with MOD I   in order to maximize functional potential and increase safety.        Start:  04/22/25    Expected End:  05/06/25 04/22/25 at 10:32 AM   Christy Tineo OT   Rehab Office: [phone]

## 2025-04-22 NOTE — PROGRESS NOTES
Spiritual Care Visit  Spiritual Care Request    Reason for Visit:  Routine Visit: Introduction  Continue Visiting: Yes  Surgical Visit: Post-op     Request Received From:       Focus of Care:  Visited With: Patient         Refer to :          Spiritual Care Assessment    Spiritual Assessment:                      Care Provided:       Sense of Community and or Jew Affiliation:  Restoration         Addressed Needs/Concerns and/or [place] Through:     Sacramental Encounters  Other Sacrament: P&B S    Outcome:        Advance Directives:         Spiritual Care Annotation    Annotation:  Patient was asleep so did not disturb, received a prayer and blessing (P&B S) by Fr. Malcolm Crum,  Restoration .

## 2025-04-23 VITALS
OXYGEN SATURATION: 94 % | HEART RATE: 76 BPM | DIASTOLIC BLOOD PRESSURE: 88 MMHG | WEIGHT: 217.81 LBS | TEMPERATURE: 98.4 F | RESPIRATION RATE: 23 BRPM | SYSTOLIC BLOOD PRESSURE: 147 MMHG | BODY MASS INDEX: 34.11 KG/M2

## 2025-04-23 LAB
LAB AP ASR DISCLAIMER: NORMAL
LABORATORY COMMENT REPORT: NORMAL
Lab: NORMAL
PATH REPORT.FINAL DX SPEC: NORMAL
PATH REPORT.GROSS SPEC: NORMAL
PATH REPORT.RELEVANT HX SPEC: NORMAL
PATH REPORT.TOTAL CANCER: NORMAL

## 2025-04-23 PROCEDURE — 2500000001 HC RX 250 WO HCPCS SELF ADMINISTERED DRUGS (ALT 637 FOR MEDICARE OP): Performed by: STUDENT IN AN ORGANIZED HEALTH CARE EDUCATION/TRAINING PROGRAM

## 2025-04-23 PROCEDURE — 2500000004 HC RX 250 GENERAL PHARMACY W/ HCPCS (ALT 636 FOR OP/ED): Mod: JZ | Performed by: NEUROLOGICAL SURGERY

## 2025-04-23 PROCEDURE — 99239 HOSP IP/OBS DSCHRG MGMT >30: CPT

## 2025-04-23 PROCEDURE — 2500000001 HC RX 250 WO HCPCS SELF ADMINISTERED DRUGS (ALT 637 FOR MEDICARE OP): Performed by: NEUROLOGICAL SURGERY

## 2025-04-23 RX ADMIN — PANTOPRAZOLE SODIUM 40 MG: 40 TABLET, DELAYED RELEASE ORAL at 08:03

## 2025-04-23 RX ADMIN — LEVETIRACETAM 500 MG: 500 TABLET, FILM COATED ORAL at 08:03

## 2025-04-23 RX ADMIN — HEPARIN SODIUM 5000 UNITS: 5000 INJECTION INTRAVENOUS; SUBCUTANEOUS at 01:44

## 2025-04-23 ASSESSMENT — PAIN - FUNCTIONAL ASSESSMENT
PAIN_FUNCTIONAL_ASSESSMENT: 0-10

## 2025-04-23 ASSESSMENT — ACTIVITIES OF DAILY LIVING (ADL)
ADEQUATE_TO_COMPLETE_ADL: YES
DRESSING YOURSELF: INDEPENDENT
HEARING - LEFT EAR: FUNCTIONAL
HEARING - RIGHT EAR: FUNCTIONAL
BATHING: INDEPENDENT
TOILETING: INDEPENDENT
FEEDING YOURSELF: INDEPENDENT
WALKS IN HOME: INDEPENDENT
GROOMING: INDEPENDENT
PATIENT'S MEMORY ADEQUATE TO SAFELY COMPLETE DAILY ACTIVITIES?: YES
JUDGMENT_ADEQUATE_SAFELY_COMPLETE_DAILY_ACTIVITIES: YES

## 2025-04-23 ASSESSMENT — PAIN SCALES - GENERAL
PAINLEVEL_OUTOF10: 0 - NO PAIN

## 2025-04-23 NOTE — DISCHARGE SUMMARY
Discharge Diagnosis  Meningioma (Multi)    Issues Requiring Follow-Up  Standard post op care    Test Results Pending At Discharge  Pending Labs       Order Current Status    Surgical Pathology Exam In process            Hospital Course  Erica Wayne is a 49 y.o. female with a past medical history of GERD and cholecystectomy. On 4/21 She was admitted to Neurosurgery for scheduled surgery.   4/21 s/p R crani for tumor resection, CTH POC. Post op sodium 133- given a fluid bolus with improvement.     PT/OT evaluated patient and recommended no further therapy needs at time of discharge.      On the day of discharge, the patient was seen and evaluated by the neurosurgery team and deemed suitable for discharge. The patient was given detailed discharge instructions and were scheduled to follow up as an outpatient.      Pertinent Physical Exam At Time of Discharge  Physical Exam  HENT:      Head: Normocephalic.      Comments: Incision C/D/I  Eyes:      Pupils: Pupils are equal, round, and reactive to light.   Cardiovascular:      Pulses: Normal pulses.   Pulmonary:      Effort: Pulmonary effort is normal.   Abdominal:      Palpations: Abdomen is soft.   Musculoskeletal:         General: Normal range of motion.   Skin:     General: Skin is warm.   Neurological:      Mental Status: She is alert and oriented to person, place, and time.      Comments: BUE 5/5  BLE 5/5         Home Medications     Medication List      START taking these medications     levETIRAcetam 500 mg tablet; Commonly known as: Keppra; Take 1 tablet   (500 mg) by mouth 2 times a day for 11 doses.   oxyCODONE 5 mg immediate release tablet; Commonly known as: Roxicodone;   Take 1 tablet (5 mg) by mouth every 6 hours if needed for severe pain (7 -   10).   polyethylene glycol 17 gram packet; Commonly known as: Glycolax,   Miralax; Take 17 g by mouth once daily. FOR CONSTIPATION WHILE TAKING PAIN   MEDICATION     CONTINUE taking these medications     D3-2000  ORAL   loratadine 10 mg tablet; Commonly known as: Claritin   lysine 500 mg tablet   pantoprazole 40 mg EC tablet; Commonly known as: ProtoNix     STOP taking these medications     chlorhexidine 4 % external liquid; Commonly known as: Hibiclens     ASK your doctor about these medications     valACYclovir 1 gram tablet; Commonly known as: Valtrex       Outpatient Follow-Up  Future Appointments   Date Time Provider Department Center   5/15/2025 11:30 AM Shelby Saavedra APRN-CNP [id number] [facility]       CHARLES Caceres-CNP  Total face to face time spent with patient/family of 30 minutes, with >50% of the time spent discussing plan of care/management, counseling/educating on disease processes, explaining results of diagnostic testing.

## 2025-04-23 NOTE — CARE PLAN
Problem: Pain  Goal: Takes deep breaths with improved pain control throughout the shift  Outcome: Progressing  Goal: Turns in bed with improved pain control throughout the shift  Outcome: Progressing  Goal: Walks with improved pain control throughout the shift  Outcome: Progressing     Problem: Skin  Goal: Participates in plan/prevention/treatment measures  Outcome: Progressing  Goal: Prevent/manage excess moisture  Outcome: Progressing     Problem: Fall/Injury  Goal: Not fall by end of shift  Outcome: Progressing  Goal: Be free from injury by end of the shift  Outcome: Progressing  Goal: Use assistive devices by end of the shift  Outcome: Progressing     Problem: Safety - Adult  Goal: Free from fall injury  Outcome: Progressing     Problem: Discharge Planning  Goal: Discharge to home or other facility with appropriate resources  Outcome: Progressing

## 2025-05-15 ENCOUNTER — OFFICE VISIT (OUTPATIENT)
Dept: NEUROSURGERY | Facility: CLINIC | Age: 50
End: 2025-05-15
Payer: COMMERCIAL

## 2025-05-15 VITALS
BODY MASS INDEX: 34.87 KG/M2 | HEIGHT: 66 IN | RESPIRATION RATE: 18 BRPM | DIASTOLIC BLOOD PRESSURE: 77 MMHG | HEART RATE: 74 BPM | WEIGHT: 217 LBS | SYSTOLIC BLOOD PRESSURE: 121 MMHG | TEMPERATURE: 97 F

## 2025-05-15 DIAGNOSIS — D32.9 MENINGIOMA (MULTI): Primary | ICD-10-CM

## 2025-05-15 PROCEDURE — 99211 OFF/OP EST MAY X REQ PHY/QHP: CPT | Performed by: NURSE PRACTITIONER

## 2025-05-15 PROCEDURE — 3008F BODY MASS INDEX DOCD: CPT | Performed by: NURSE PRACTITIONER

## 2025-05-15 ASSESSMENT — PAIN SCALES - GENERAL: PAINLEVEL_OUTOF10: 6

## 2025-06-02 NOTE — PROGRESS NOTES
"[facility]  Neurosurgery    History of Present Illness      Cynthia Wayne is a 50-year-old female with PMH significant for GERD and meningioma x 2 with growth or R convexity meningioma. She is now s/p R craniotomy for tumor resection with Dr. Lewis on 04/21/25. Postoperative course was significant for NA of 133 requiring fluid bolus with improvement. CT imaging with POC. Patient was able to be discharged to home on 04/23/25 with no additional rehab needs. She presents to clinic for POV.     Doing well since being home. Has a headache today but not requiring oxycodone for management. She does have LUE numbness to three fingers that has been persistent since surgery. While slightly improved it is till noticeable. Denies any blurred or double vision, dizziness, or seizure like activity. Completed Keppra prophylaxis.     Works as an RN in endoscopy/ radiology. Would like to return back to work on 06/02.         Objective      Vitals:   /77   Pulse 74   Temp 36.1 °C (97 °F)   Resp 18   Ht 1.676 m (5' 6\")   Wt 98.4 kg (217 lb)   BMI 35.02 kg/m²         Physical Exam:    A&Ox3   Fluent speech   EOMI; FC x 4   HEADLEY; strength 5/5; no drift   Face and shoulder shrug symmetrical   SILT   Tongue midline   No focal motor deficits on exam   Gait normal      Incision:         Relevant Results:    CTH 04/21/25: Postsurgical changes from R parietal craniotomy with small amount of blood products as expected. There is a second stable parasagittal lesion consistent with meningioma.               Assessment & Plan      Diagnosis:  Diagnoses and all orders for this visit:  Meningioma (Multi)          Provider Impression:     Patient is a 50-year-old female presenting for postoperative evaluation now s/p R craniotomy for tumor resection on 04/21/25 with Dr. Lewis. Incision healing with trace scabbing as above. No incisional drainage or erythema.     Final Surgical Pathology: Meningioma WHO grade 1     - MRI " brain w/wo due 3 months post op   - Continue to keep incision C/D/I   - No chemicals to hair for 3 months post op   - Ok to RTW on 06/02/25  - Will continue to monitor L hand numbness if persistent may benefit from EMG     Plan discussed with patient who was in agreement. All questions answered.       Medical History     Medical History[1]  Surgical History[2]  Social History[3]  Family History[4]  Allergies[5]  Current Outpatient Medications   Medication Instructions    cholecalciferol, vitamin D3, (D3-2000 ORAL) 1 tablet, Daily    levETIRAcetam (KEPPRA) 500 mg, oral, 2 times daily    loratadine (Claritin) 10 mg tablet Take 1 tablet (10 mg) by mouth once daily as needed.    lysine 500 mg, Daily    oxyCODONE (ROXICODONE) 5 mg, oral, Every 6 hours PRN    pantoprazole (PROTONIX) 40 mg, Daily before breakfast    polyethylene glycol (GLYCOLAX, MIRALAX) 17 g, oral, Daily, FOR CONSTIPATION WHILE TAKING PAIN MEDICATION    valACYclovir (Valtrex) 1 gram tablet take 2 tablets by mouth twice a day for 1 day at first sign of cold sore                              [1]   Past Medical History:  Diagnosis Date    Colon polyp     GERD (gastroesophageal reflux disease)    [2]   Past Surgical History:  Procedure Laterality Date    COLONOSCOPY  01/31/2018    Complete Colonoscopy    OTHER SURGICAL HISTORY  02/15/2018    Cholecystectomy Laparoscopic Robotic-Assisted   [3]   Social History  Tobacco Use    Smoking status: Never    Smokeless tobacco: Never   Substance Use Topics    Alcohol use: Yes     Alcohol/week: 1.0 standard drink of alcohol     Types: 1 Standard drinks or equivalent per week    Drug use: Never   [4]   Family History  Problem Relation Name Age of Onset    No Known Problems Mother      Hypertension Father      Diabetes Father     [5] No Known Allergies

## 2025-07-31 ENCOUNTER — PATIENT MESSAGE (OUTPATIENT)
Dept: NEUROSURGERY | Facility: CLINIC | Age: 50
End: 2025-07-31
Payer: COMMERCIAL

## 2025-08-02 ENCOUNTER — HOSPITAL ENCOUNTER (OUTPATIENT)
Dept: RADIOLOGY | Facility: EXTERNAL LOCATION | Age: 50
Discharge: HOME | End: 2025-08-02
Payer: COMMERCIAL

## 2025-08-14 ENCOUNTER — OFFICE VISIT (OUTPATIENT)
Dept: NEUROSURGERY | Facility: CLINIC | Age: 50
End: 2025-08-14
Payer: COMMERCIAL

## (undated) DEVICE — SPONGE, HEMOSTATIC, CELLULOSE, SURGICEL, FIBRILLAR, 2 X 4 IN

## (undated) DEVICE — MANIFOLD, 4 PORT NEPTUNE STANDARD

## (undated) DEVICE — COVER, TABLE, UHC

## (undated) DEVICE — ELECTRODE, ELECTROSURGICAL, BLADE, INSULATED, ENT/IMA, STERILE

## (undated) DEVICE — DRESSING, MEPILEX, BORDER, HEEL, 8.7 X 9.1 IN

## (undated) DEVICE — TUBING, SMOKE EVAC, 3/8 X 10 FT

## (undated) DEVICE — GOWN, SURGICAL, SMARTGOWN, LARGE, STERILE

## (undated) DEVICE — TIP, SONOPET IQ, STANDARD, ROUND, 12CM

## (undated) DEVICE — DRAPE, MICROSCOPE, FOR ZEISS 65MM, VARI-LENS II, 52 X 150

## (undated) DEVICE — BUR, ACORN 9MM PRECISION

## (undated) DEVICE — GOWN, SURGICAL, SMARTGOWN, XLARGE, STERILE

## (undated) DEVICE — MARKER, SKIN, RULER AND LABEL PACK, CUSTOM

## (undated) DEVICE — TUBING, SUCTION, MICROSURGICAL, MICROVAC, 5 FR

## (undated) DEVICE — COVER, CART, 45 X 27 X 48 IN, CLEAR

## (undated) DEVICE — DRESSING, TRANSPARENT, TEGADERM, 2-3/8 X 2-3/4 IN

## (undated) DEVICE — ELECTRODE, GROUND PLATE

## (undated) DEVICE — SUTURE, NUROLON, 4-0, 18 IN, TF, CONTROL RELEASE, MULTIPACK, BLACK

## (undated) DEVICE — TUBING, SUCTION, CONNECTING, STERILE 0.25 X 120 IN., LF

## (undated) DEVICE — SEALANT, HEMOSTATIC, FLOSEAL, 10 ML

## (undated) DEVICE — APPLICATOR, PREP, CHLORAPREP, W/ORANGE TINT, 10.5ML

## (undated) DEVICE — CONTAINER, SPECIMEN, 120 ML, STERILE

## (undated) DEVICE — CATHETER TRAY, SURESTEP, 16FR, URINE METER W/STATLOCK

## (undated) DEVICE — ELECTRODE, CORKSCREW NEEDLE 1.5M LENGTH

## (undated) DEVICE — DRAPE PACK, CRANIOTOMY, CUSTOM, UHC

## (undated) DEVICE — BUR, ROUTER BIT, FA2, TAPERED, 2.3MM

## (undated) DEVICE — Device

## (undated) DEVICE — DRESSING, MEPILEX, BORDER, SACRUM, 8.7 X 9.8 IN

## (undated) DEVICE — RETRACTOR, HOOK, FISH, NEURO

## (undated) DEVICE — REST, HEAD, BAGEL, 9 IN

## (undated) DEVICE — BAG, PLASTIC, 10 X 17 IN

## (undated) DEVICE — SPONGE, HEMOSTATIC, GELATIN, SURGIFOAM, 8 X 12.5 CM X 10 MM

## (undated) DEVICE — DRESSING, GAUZE, PETROLATUM, PATCH, XEROFORM, 1 X 8 IN, STERILE

## (undated) DEVICE — PAD, GROUNDING, ELECTROSURGICAL, W/9 FT CABLE, POLYHESIVE II, ADULT, LF

## (undated) DEVICE — TAPE, SILK, DURAPORE, 3 IN X 10 YD, LF

## (undated) DEVICE — BLADE, OSCILLATING/SAGITTAL, 25MM X 9MM

## (undated) DEVICE — NEEDLE, ELECTRODE, SUBDERMAL, PAIRED, 2.0 LEAD, DISP